# Patient Record
Sex: FEMALE | Race: WHITE | NOT HISPANIC OR LATINO | Employment: FULL TIME | URBAN - METROPOLITAN AREA
[De-identification: names, ages, dates, MRNs, and addresses within clinical notes are randomized per-mention and may not be internally consistent; named-entity substitution may affect disease eponyms.]

---

## 2018-07-12 ENCOUNTER — HOSPITAL ENCOUNTER (EMERGENCY)
Facility: HOSPITAL | Age: 33
Discharge: HOME | End: 2018-07-12
Attending: FAMILY MEDICINE | Admitting: FAMILY MEDICINE
Payer: COMMERCIAL

## 2018-07-12 VITALS
HEART RATE: 68 BPM | SYSTOLIC BLOOD PRESSURE: 118 MMHG | TEMPERATURE: 99.2 F | BODY MASS INDEX: 24.59 KG/M2 | HEIGHT: 64 IN | OXYGEN SATURATION: 95 % | RESPIRATION RATE: 16 BRPM | DIASTOLIC BLOOD PRESSURE: 82 MMHG | WEIGHT: 144 LBS

## 2018-07-12 DIAGNOSIS — H66.90 OTITIS MEDIA: ICD-10-CM

## 2018-07-12 DIAGNOSIS — T78.40XA ALLERGIC REACTION, INITIAL ENCOUNTER: Primary | ICD-10-CM

## 2018-07-12 PROCEDURE — 96372 THER/PROPH/DIAG INJ SC/IM: CPT

## 2018-07-12 PROCEDURE — 99282 EMERGENCY DEPT VISIT SF MDM: CPT

## 2018-07-12 RX ORDER — METHYLPREDNISOLONE SODIUM SUCCINATE 125 MG/2ML
125 INJECTION, POWDER, LYOPHILIZED, FOR SOLUTION INTRAMUSCULAR; INTRAVENOUS ONCE
Status: COMPLETED | OUTPATIENT
Start: 2018-07-12 | End: 2018-07-12

## 2018-07-12 RX ORDER — DIPHENHYDRAMINE HYDROCHLORIDE 50 MG/ML
50 INJECTION INTRAMUSCULAR; INTRAVENOUS ONCE
Status: COMPLETED | OUTPATIENT
Start: 2018-07-12 | End: 2018-07-12

## 2018-07-12 RX ORDER — NEOMYCIN SULFATE, POLYMYXIN B SULFATE AND HYDROCORTISONE 10; 3.5; 1 MG/ML; MG/ML; [USP'U]/ML
2 SUSPENSION/ DROPS AURICULAR (OTIC) 3 TIMES DAILY
Qty: 1.5 ML | Refills: 0 | Status: SHIPPED | OUTPATIENT
Start: 2018-07-12 | End: 2022-02-09

## 2018-07-12 RX ORDER — FAMOTIDINE 20 MG/1
20 TABLET, FILM COATED ORAL DAILY
Qty: 30 TABLET | Refills: 0 | Status: SHIPPED | OUTPATIENT
Start: 2018-07-12 | End: 2022-02-09

## 2018-07-12 RX ORDER — PREDNISONE 20 MG/1
20 TABLET ORAL DAILY
Qty: 15 TABLET | Refills: 0 | Status: SHIPPED | OUTPATIENT
Start: 2018-07-12 | End: 2018-07-17

## 2018-07-12 RX ORDER — NORGESTIMATE AND ETHINYL ESTRADIOL 7DAYSX3 28
1 KIT ORAL
COMMUNITY
Start: 2018-05-02 | End: 2022-02-09

## 2018-07-12 RX ADMIN — METHYLPREDNISOLONE SODIUM SUCCINATE 125 MG: 125 INJECTION, POWDER, FOR SOLUTION INTRAMUSCULAR; INTRAVENOUS at 14:21

## 2018-07-12 RX ADMIN — DIPHENHYDRAMINE HYDROCHLORIDE 50 MG: 50 INJECTION INTRAMUSCULAR; INTRAVENOUS at 14:21

## 2018-07-12 NOTE — ED PROVIDER NOTES
History  Chief Complaint   Patient presents with    Rash    Ear Problem       History provided by:  Patient   used: No    Rash   Location:  Shoulder/arm and torso  Torso rash location:  R breast, L breast and L chest  Quality: dryness, itchiness and redness    Quality: not blistering, not bruising, not burning, not draining, not painful, not peeling, not scaling, not swelling and not weeping    Severity:  Mild  Onset quality:  Sudden  Duration:  1 hour  Timing:  Constant  Progression:  Waxing and waning  Chronicity:  New  Context: not animal contact, not chemical exposure, not diapers, not exposure to similar rash, not food, not hot tub use, not insect bite/sting, not medications, not new detergent/soap, not nuts, not pollen, not pregnancy, not sick contacts and not sun exposure    Relieved by:  None tried  Worsened by:  Nothing  Ineffective treatments:  None tried      Prior to Admission Medications   Prescriptions Last Dose Informant Patient Reported? Taking?   norgestimate-ethinyl estradiol (TRI-SPRINTEC) 0 18/0 215/0 25 MG-35 MCG per tablet   Yes Yes   Sig: Take 1 tablet by mouth      Facility-Administered Medications: None       Past Medical History:   Diagnosis Date    Anxiety        Past Surgical History:   Procedure Laterality Date    INDUCED          History reviewed  No pertinent family history  I have reviewed and agree with the history as documented  Social History   Substance Use Topics    Smoking status: Current Every Day Smoker     Packs/day: 1 00    Smokeless tobacco: Never Used    Alcohol use No        Review of Systems   HENT: Positive for ear pain  Negative for congestion, dental problem, drooling, ear discharge, facial swelling, hearing loss, mouth sores, nosebleeds, postnasal drip, rhinorrhea, sinus pain and sinus pressure  Respiratory: Negative  Cardiovascular: Negative  Gastrointestinal: Negative  Genitourinary: Negative      Skin: Positive for rash  Allergic/Immunologic: Negative  Neurological: Negative  Psychiatric/Behavioral: Negative  Physical Exam  Physical Exam   Constitutional: She is oriented to person, place, and time  She appears well-developed and well-nourished  HENT:   Head: Normocephalic and atraumatic  Right Ear: External ear normal    Left Ear: There is tenderness  Tympanic membrane is erythematous  Nose: Nose normal    Mouth/Throat: Oropharynx is clear and moist  No oropharyngeal exudate  Eyes: Conjunctivae and EOM are normal  Pupils are equal, round, and reactive to light  Right eye exhibits no discharge  Left eye exhibits no discharge  No scleral icterus  Neck: Normal range of motion  Neck supple  Cardiovascular: Normal rate and regular rhythm  Pulmonary/Chest: Effort normal and breath sounds normal  No respiratory distress  She has no wheezes  Abdominal: Soft  Bowel sounds are normal    Lymphadenopathy:     She has no cervical adenopathy  Neurological: She is alert and oriented to person, place, and time  Skin: Skin is warm and dry  Capillary refill takes less than 2 seconds  Psychiatric: She has a normal mood and affect  Her behavior is normal    Nursing note and vitals reviewed        Vital Signs  ED Triage Vitals [07/12/18 1352]   Temperature Pulse Respirations Blood Pressure SpO2   98 8 °F (37 1 °C) 78 16 121/78 99 %      Temp Source Heart Rate Source Patient Position - Orthostatic VS BP Location FiO2 (%)   Tympanic Monitor Lying Right arm --      Pain Score       3           Vitals:    07/12/18 1352   BP: 121/78   Pulse: 78   Patient Position - Orthostatic VS: Lying       Visual Acuity      ED Medications  Medications   diphenhydrAMINE (BENADRYL) injection 50 mg (50 mg Intramuscular Given 7/12/18 1421)   methylPREDNISolone sodium succinate (Solu-MEDROL) injection 125 mg (125 mg Intramuscular Given 7/12/18 1421)       Diagnostic Studies  Results Reviewed     None                 No orders to display              Procedures  Procedures       Phone Contacts  ED Phone Contact    ED Course  ED Course as of Jul 12 1534   u Jul 12, 2018   1506 Patient states she is feeling much better  Advised to continue with the prednisone and Benadryl as needed advised return to the ED if symptoms worsen  MDM  CritCare Time    Disposition  Final diagnoses: Allergic reaction, initial encounter   Otitis media     Time reflects when diagnosis was documented in both MDM as applicable and the Disposition within this note     Time User Action Codes Description Comment    7/12/2018  2:17 PM Arpit Wetzel Add [T78 40XA] Allergic reaction, initial encounter     7/12/2018  2:18 PM Arpit Wetzel Add [H66 90] Otitis media       ED Disposition     ED Disposition Condition Comment    Discharge  [address] discharge to home/self care  Condition at discharge: Stable        Follow-up Information     Follow up With Specialties Details Why Contact Info      In 2 days If symptoms worsen pcp          Patient's Medications   Discharge Prescriptions    FAMOTIDINE (PEPCID) 20 MG TABLET    Take 1 tablet (20 mg total) by mouth daily for 5 days       Start Date: 7/12/2018 End Date: 7/17/2018       Order Dose: 20 mg       Quantity: 30 tablet    Refills: 0    NEOMYCIN-POLYMYXIN-HYDROCORTISONE (CORTISPORIN) 0 35%-10,000 UNITS/ML-1% OTIC SUSPENSION    Administer 2 drops into the left ear 3 (three) times a day for 5 days       Start Date: 7/12/2018 End Date: 7/17/2018       Order Dose: 2 drops       Quantity: 1 5 mL    Refills: 0    PREDNISONE 20 MG TABLET    Take 1 tablet (20 mg total) by mouth daily for 5 days       Start Date: 7/12/2018 End Date: 7/17/2018       Order Dose: 20 mg       Quantity: 15 tablet    Refills: 0     No discharge procedures on file      ED Provider  Electronically Signed by           Ivory Kline MD  07/12/18 451 Jakub Leonard MD  07/12/18 5004

## 2018-07-12 NOTE — ED NOTES
Patient states that she is feeling better  She is drowsy but the itching has stopped        Marcelo Boss RN  07/12/18 9078

## 2018-07-12 NOTE — DISCHARGE INSTRUCTIONS
General Allergic Reaction   WHAT YOU NEED TO KNOW:   An allergic reaction is your body's response to an allergen  Allergens include medicines, food, insect stings, animal dander, mold, latex, chemicals, and dust mites  Pollen from trees, grass, and weeds can also cause an allergic reaction  DISCHARGE INSTRUCTIONS:   Return to the emergency department if:   · You have a skin rash, hives, swelling, or itching that gets worse  · You have trouble breathing, shortness of breath, wheezing, or coughing  · Your throat tightens, or your lips or tongue swell  · You have trouble swallowing or speaking  · You have dizziness, lightheadedness, fainting, or confusion  · You have nausea, vomiting, diarrhea, or abdominal cramps  · You have chest pain or tightness  Contact your healthcare provider if:   · You have questions or concerns about your condition or care  Medicines:   · Medicines  may be given to relieve certain allergy symptoms such as itching, sneezing, and swelling  You may take them as a pill or use drops in your nose or eyes  Topical treatments may be given to put directly on your skin to help decrease itching or swelling  · Take your medicine as directed  Contact your healthcare provider if you think your medicine is not helping or if you have side effects  Tell him of her if you are allergic to any medicine  Keep a list of the medicines, vitamins, and herbs you take  Include the amounts, and when and why you take them  Bring the list or the pill bottles to follow-up visits  Carry your medicine list with you in case of an emergency  Follow up with your healthcare provider as directed:  Write down your questions so you remember to ask them during your visits  Self-care:   · Avoid the allergen  that you think may have caused your allergic reaction  · Use cold compresses  on your skin or eyes if they were affected by the allergic reaction   Cold compresses may help to soothe your skin or eyes  · Rinse your nasal passages  with a saline solution  Daily rinsing may help clear your nose of allergens  · Do not smoke  Your allergy symptoms may decrease if you are not around smoke  Nicotine and other chemicals in cigarettes and cigars can also cause lung damage  Ask your healthcare provider for information if you currently smoke and need help to quit  E-cigarettes or smokeless tobacco still contain nicotine  Talk to your healthcare provider before you use these products  © 2017 [facility] Information is for End User's use only and may not be sold, redistributed or otherwise used for commercial purposes  All illustrations and images included in CareNotes® are the copyrighted property of A D A M , Inc  or Jaspal Diana  The above information is an  only  It is not intended as medical advice for individual conditions or treatments  Talk to your doctor, nurse or pharmacist before following any medical regimen to see if it is safe and effective for you

## 2022-02-09 ENCOUNTER — OFFICE VISIT (OUTPATIENT)
Dept: FAMILY MEDICINE CLINIC | Facility: CLINIC | Age: 37
End: 2022-02-09
Payer: COMMERCIAL

## 2022-02-09 VITALS
WEIGHT: 172 LBS | DIASTOLIC BLOOD PRESSURE: 78 MMHG | OXYGEN SATURATION: 100 % | BODY MASS INDEX: 28.66 KG/M2 | TEMPERATURE: 99.3 F | HEIGHT: 65 IN | SYSTOLIC BLOOD PRESSURE: 126 MMHG | HEART RATE: 68 BPM

## 2022-02-09 DIAGNOSIS — Z23 ENCOUNTER FOR IMMUNIZATION: ICD-10-CM

## 2022-02-09 DIAGNOSIS — Z13.29 THYROID DISORDER SCREEN: ICD-10-CM

## 2022-02-09 DIAGNOSIS — Z13.220 LIPID SCREENING: ICD-10-CM

## 2022-02-09 DIAGNOSIS — F41.9 ANXIETY: ICD-10-CM

## 2022-02-09 DIAGNOSIS — Z76.89 ENCOUNTER TO ESTABLISH CARE: Primary | ICD-10-CM

## 2022-02-09 DIAGNOSIS — Z13.1 DIABETES MELLITUS SCREENING: ICD-10-CM

## 2022-02-09 DIAGNOSIS — Z11.59 NEED FOR HEPATITIS C SCREENING TEST: ICD-10-CM

## 2022-02-09 DIAGNOSIS — Z11.4 SCREENING FOR HIV (HUMAN IMMUNODEFICIENCY VIRUS): ICD-10-CM

## 2022-02-09 DIAGNOSIS — Z12.4 SCREENING FOR CERVICAL CANCER: ICD-10-CM

## 2022-02-09 DIAGNOSIS — Z23 FLU VACCINE NEED: ICD-10-CM

## 2022-02-09 DIAGNOSIS — Z71.6 ENCOUNTER FOR SMOKING CESSATION COUNSELING: ICD-10-CM

## 2022-02-09 DIAGNOSIS — F33.8 SEASONAL DEPRESSION (HCC): ICD-10-CM

## 2022-02-09 PROCEDURE — 4004F PT TOBACCO SCREEN RCVD TLK: CPT | Performed by: PHYSICIAN ASSISTANT

## 2022-02-09 PROCEDURE — 3008F BODY MASS INDEX DOCD: CPT | Performed by: PHYSICIAN ASSISTANT

## 2022-02-09 PROCEDURE — 99204 OFFICE O/P NEW MOD 45 MIN: CPT | Performed by: PHYSICIAN ASSISTANT

## 2022-02-09 PROCEDURE — 3725F SCREEN DEPRESSION PERFORMED: CPT | Performed by: PHYSICIAN ASSISTANT

## 2022-02-09 PROCEDURE — 90686 IIV4 VACC NO PRSV 0.5 ML IM: CPT

## 2022-02-09 PROCEDURE — 90471 IMMUNIZATION ADMIN: CPT

## 2022-02-09 RX ORDER — BUPROPION HYDROCHLORIDE 150 MG/1
150 TABLET ORAL EVERY MORNING
Qty: 90 TABLET | Refills: 0 | Status: SHIPPED | OUTPATIENT
Start: 2022-02-09 | End: 2022-05-03 | Stop reason: SDUPTHER

## 2022-02-09 NOTE — PROGRESS NOTES
New Patient    Molly Ontiveros 40 y o  female   Date:  2/9/2022      Assessment and Plan:    Lulu Bernardo was seen today for establish care and nicotine dependence  Diagnoses and all orders for this visit:    Encounter to establish care    Encounter for smoking cessation counseling  -     buPROPion (Wellbutrin XL) 150 mg 24 hr tablet; Take 1 tablet (150 mg total) by mouth every morning    Seasonal depression (HCC)  -     buPROPion (Wellbutrin XL) 150 mg 24 hr tablet; Take 1 tablet (150 mg total) by mouth every morning    Anxiety  -     buPROPion (Wellbutrin XL) 150 mg 24 hr tablet; Take 1 tablet (150 mg total) by mouth every morning    Need for hepatitis C screening test  -     Hepatitis C antibody; Future    Encounter for immunization  Comments:  UTD on tdap 6 years ago     Screening for HIV (human immunodeficiency virus)  -     HIV 1/2 Antigen/Antibody (4th Generation) w Reflex [facility]; Future    Screening for cervical cancer  -     Ambulatory Referral to Obstetrics / Gynecology; Future    Diabetes mellitus screening  -     Comprehensive metabolic panel; Future    Lipid screening  -     Lipid panel; Future    Thyroid disorder screen  -     TSH, 3rd generation with Free T4 reflex; Future    Flu vaccine need  -     influenza vaccine, quadrivalent, 0 5 mL, preservative-free, for adult and pediatric patients 6 mos+ (AFLURIA, FLUARIX, FLULAVAL, FLUZONE)      BMI Counseling: Body mass index is 28 62 kg/m²  The BMI is above normal  Nutrition recommendations include decreasing portion sizes and encouraging healthy choices of fruits and vegetables  Exercise recommendations include exercising 3-5 times per week  Rationale for BMI follow-up plan is due to patient being overweight or obese  She watches portion sizes and includes well balanced options    Depression Screening and Follow-up Plan: Patient's depression screening was positive with a PHQ-2 score of 3  Their PHQ-9 score was 11   Patient assessed for underlying major depression  Brief counseling provided and recommend additional follow-up/re-evaluation next office visit  HPI:  Chief Complaint   Patient presents with    hospitals Care     Many years since having a primary doctor     Nicotine Dependence     Would like to quit smoking - previous used Wellbutrin which helped      HPI   Patient is a 41 yo female who presents to [facility] care  No recent PCP cares for a few years  She has hx of seasonal, recurrent depression  No SI  She is struggling with anxiety, especially over stress of smoking, being hard on herself and the toll it is taking on her family  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, works from home, very stressful time  She had to get rid of dog  She was put on wellbutrin in the past to help her quit smoking when was previously on birth control  It helped with smoking and leveled out her mental health  She has 2 children  She would like to see OBGYN, would like to consider obtain tubal ligation  She had tdap about 6-7 years ago  She will be starting dental appts  She goes for eye exams, wears glasses  She moves 30 mins every afternoon  She follows well balanced diet and watches portions  She does use marijuana, helps to ease her anxieties and help her sleep  ROS: Review of Systems   Constitutional: Negative  Eyes: Visual disturbance: glasses  Respiratory: Negative  Cardiovascular: Negative  Gastrointestinal: Negative  Genitourinary: Negative  Neurological: Negative  Psychiatric/Behavioral: Positive for dysphoric mood and sleep disturbance  The patient is nervous/anxious          Past Medical History:   Diagnosis Date    Anxiety      Patient Active Problem List   Diagnosis    Lumbar disc herniation    Status post epidural steroid injection       Past Surgical History:   Procedure Laterality Date    INDUCED          Social History     Socioeconomic History    Marital status: Single     Spouse name: None    Number of children: None  Years of education: None    Highest education level: None   Occupational History    None   Tobacco Use    Smoking status: Current Every Day Smoker     Packs/day: 0 50    Smokeless tobacco: Never Used   Vaping Use    Vaping Use: Never used   Substance and Sexual Activity    Alcohol use: Yes     Comment: occasionally    Drug use: Yes     Types: Marijuana    Sexual activity: Yes     Partners: Female   Other Topics Concern    None   Social History Narrative    None     Social Determinants of Health     Financial Resource Strain: Not on file   Food Insecurity: Not on file   Transportation Needs: Not on file   Physical Activity: Not on file   Stress: Not on file   Social Connections: Not on file   Intimate Partner Violence: Not on file   Housing Stability: Not on file       Family History   Problem Relation Age of Onset    Alcohol abuse Mother     Diabetes Mother     Hypertension Maternal Grandmother     Cancer Paternal Grandmother     Heart disease Paternal Grandfather        No Known Allergies      Current Outpatient Medications:     buPROPion (Wellbutrin XL) 150 mg 24 hr tablet, Take 1 tablet (150 mg total) by mouth every morning, Disp: 90 tablet, Rfl: 0    PHQ-2/9 Depression Screening    Little interest or pleasure in doing things: 1 - several days  Feeling down, depressed, or hopeless: 2 - more than half the days  Trouble falling or staying asleep, or sleeping too much: 1 - several days  Feeling tired or having little energy: 2 - more than half the days  Poor appetite or overeatin - more than half the days  Feeling bad about yourself - or that you are a failure or have let yourself or your family down: 2 - more than half the days  Trouble concentrating on things, such as reading the newspaper or watching television: 1 - several days  Moving or speaking so slowly that other people could have noticed   Or the opposite - being so fidgety or restless that you have been moving around a lot more than usual: 0 - not at all  Thoughts that you would be better off dead, or of hurting yourself in some way: 0 - not at all  PHQ-2 Score: 3  PHQ-2 Interpretation: POSITIVE depression screen  PHQ-9 Score: 11   PHQ-9 Interpretation: Moderate depression        Physical Exam:  /78 (BP Location: Left arm, Patient Position: Sitting)   Pulse 68   Temp 99 3 °F (37 4 °C)   Ht 5' 5" (1 651 m)   Wt 78 kg (172 lb)   SpO2 100%   BMI 28 62 kg/m²     Physical Exam  Constitutional:       General: She is not in acute distress  Appearance: Normal appearance  She is not ill-appearing  HENT:      Head: Normocephalic and atraumatic  Right Ear: Tympanic membrane, ear canal and external ear normal       Left Ear: Tympanic membrane, ear canal and external ear normal       Nose: Nose normal       Mouth/Throat:      Mouth: Mucous membranes are moist       Pharynx: Oropharynx is clear  Eyes:      Conjunctiva/sclera: Conjunctivae normal       Pupils: Pupils are equal, round, and reactive to light  Cardiovascular:      Rate and Rhythm: Normal rate and regular rhythm  Heart sounds: No murmur heard  Pulmonary:      Effort: Pulmonary effort is normal  No respiratory distress  Breath sounds: Normal breath sounds  No wheezing  Musculoskeletal:         General: No deformity  Cervical back: Normal range of motion and neck supple  Lymphadenopathy:      Cervical: No cervical adenopathy  Skin:     General: Skin is warm and dry  Coloration: Skin is not pale  Neurological:      General: No focal deficit present  Mental Status: She is alert and oriented to person, place, and time     Psychiatric:         Mood and Affect: Mood normal          Behavior: Behavior normal          Judgment: Judgment normal

## 2022-03-03 ENCOUNTER — TELEPHONE (OUTPATIENT)
Dept: OBGYN CLINIC | Facility: MEDICAL CENTER | Age: 37
End: 2022-03-03

## 2022-03-09 ENCOUNTER — TELEMEDICINE (OUTPATIENT)
Dept: FAMILY MEDICINE CLINIC | Facility: CLINIC | Age: 37
End: 2022-03-09
Payer: COMMERCIAL

## 2022-03-09 DIAGNOSIS — Z71.6 ENCOUNTER FOR SMOKING CESSATION COUNSELING: Primary | ICD-10-CM
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
## 2022-03-09 DIAGNOSIS — F33.8 SEASONAL DEPRESSION (HCC): ICD-10-CM

## 2022-03-09 DIAGNOSIS — F41.9 ANXIETY: ICD-10-CM

## 2022-03-09 PROCEDURE — 99213 OFFICE O/P EST LOW 20 MIN: CPT | Performed by: PHYSICIAN ASSISTANT

## 2022-03-09 PROCEDURE — 4004F PT TOBACCO SCREEN RCVD TLK: CPT | Performed by: PHYSICIAN ASSISTANT

## 2022-03-09 NOTE — PROGRESS NOTES
Virtual Regular Visit    Verification of patient location:    Patient is located in the following state in which I hold an active license [place]      Assessment/Plan:    Problem List Items Addressed This Visit     None      Visit Diagnoses     Encounter for smoking cessation counseling    -  Primary    was able to wean now not buying any more; will continue wellbutrin to help reduce cravings; keep up the great work    Seasonal depression ([facility] [address] )        mood stable and improved with wellbutrin, no changes    Anxiety        stable and improved with wellbutrin, no changes     Overuse injury        carpal tunnel vs tendinitis; neutral brace, ice 20 mins, prn NSAID, can continue thc lotion prn                Reason for visit is   Chief Complaint   Patient presents with    Virtual Regular Visit        Encounter provider Travis Jacobs PA-C    Provider located at [address] [facility],   [address] [facility] Jey Brown      Recent Visits  No visits were found meeting these conditions  Showing recent visits within past 7 days and meeting all other requirements  Today's Visits  Date Type Provider Dept   03/09/22 Telemedicine Travis Jacobs PA-C [facility] [address] [facility] today's visits and meeting all other requirements  Future Appointments  No visits were found meeting these conditions  Showing future appointments within next 150 days and meeting all other requirements       The patient was identified by name and date of birth  Danielle Murphy was informed that this is a telemedicine visit and that the visit is being conducted through [address] Now and patient was informed that this is a secure, HIPAA-compliant platform  She agrees to proceed     My office door was closed  No one else was in the room  She acknowledged consent and understanding of privacy and security of the video platform   The patient has agreed to participate and understands they can discontinue the visit at any time  Patient is aware this is a billable service  Subjective  Donya Saucedo is a 40 y o  female who presents for 1 month follow up  HPI   At last visit, wellbutrin was started for smoking cessation and anxiety/depression  She has been able to wean down to 1-2 cig per day  She is hoping to not buy any more packs and appreciates that the urge to smoke is much improved, just needs to break the habit  She has noticed a marked improvement in overall mood and 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  She does not feel need to increase dose at this time  She is having pain and numbness in her R hand  Repetitively using hand - typing and mousing all day  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  She was able to use thc lotion with some relief  Past Medical History:   Diagnosis Date    Anxiety        Past Surgical History:   Procedure Laterality Date    INDUCED          Current Outpatient Medications   Medication Sig Dispense Refill    buPROPion (Wellbutrin XL) 150 mg 24 hr tablet Take 1 tablet (150 mg total) by mouth every morning 90 tablet 0     No current facility-administered medications for this visit  No Known Allergies    Review of Systems   Constitutional: Negative  Musculoskeletal: Positive for arthralgias (hand) and myalgias (oversue clicking/typing/using mouse)  Psychiatric/Behavioral: Dysphoric mood: improved  Nervous/anxious: improved, better able to cope with stress of work         Video Exam    There were no vitals filed for this visit  Physical Exam  Constitutional:       General: She is not in acute distress  Appearance: Normal appearance  Pulmonary:      Effort: No respiratory distress  Musculoskeletal:      Comments: ROM intact of R wrist   Neurological:      General: No focal deficit present  Mental Status: She is alert and oriented to person, place, and time     Psychiatric:         Mood and Affect: Mood normal          Behavior: Behavior normal          Thought Content: Thought content normal          Judgment: Judgment normal           I spent 10 minutes directly with the patient during this visit    [address] verbally agrees to participate in [employer]  Pt is aware that [employer] could be limited without vital signs or the ability to perform a full hands-on physical Garcia Screws understands she or the provider may request at any time to terminate the video visit and request the patient to seek care or treatment in person

## 2022-03-28 NOTE — PROGRESS NOTES
[facility] [place], [place]    ASSESSMENT/PLAN: Danielle Murphy is a 40 y o  No obstetric history on file  who presents for annual gynecologic exam     Encounter for routine gynecologic examination  - Routine well woman exam completed today  - Cervical Cancer Screening: Current ASCCP Guidelines reviewed  Last Pap: Not on file 2018  Next Pap Due: today  - HPV Vaccination status: Not immunized  - STI screening offered including HIV testing: not indicated based on hx or requested at time of visit  - Contraceptive counseling discussed  Current contraception: desires permanent sterilization, but at this time would like to go back on OCP  States that she only smokes 1 cig a day and had end date for 20 days from now  -- RTO 3 months OCP check  - RTO 1 yr for annual  Will refer to physician when she is ready to have tubal       Additional problems addressed during this visit:  1  Routine gynecological examination  -     Liquid-based pap, screening    2  Screening for malignant neoplasm of cervix  -     Liquid-based pap, screening    3  Screening for cervical cancer  -     Ambulatory Referral to Obstetrics / Gynecology    4  BCP (birth control pills) initiation  -     Norethin-Eth Estrad-Fe Biphas (Lo Loestrin Fe) 1 MG-10 MCG / 10 MCG TABS; Take 1 tablet by mouth daily    Risk/benefits, side effects and administration reviewed  Questions asked and answered  Verbalizes understanding    - reviewed smoking and risks  States that she is down to 1 per day and quitting in 20 days  - will have 3 month follow-up and if still smoking will need to consider progestin only         CC:  Annual Gynecologic Examination    HPI: Danielle Murphy is a 40 y o  No obstetric history on file  who presents for annual gynecologic examination  Amy Means presents for gyn exam today  3/8/22 LMP  Menses is regular, flow is 3-5 days or moderate flow, every 25-30 days   Using condoms as birth control method  Happy with method  Last pap smear 2018, no history of abnormal pap smear  6-8 hrs sleep per day  2-3 servings of calcium rich food per day  Usually walks and hikes regularly  2-3 servings of caffeine per day  Ocassionally perform SBE monthly  At this time would like an oral contraceptive- we discussed fact that she is a smoker  States that she is on Wellbutrin and is down to 1 cig per day and plans to stop in 20 days  Concerns : States that she would like to have a tubal ligation and states that she never wants to get pregnant again and would like to start a birth control pill until she can have tubal  Does not want long term hormonal treatment and does not want an IUD  -  - Today we had a detailed discussion regarding alternatives to permanent contraception, including LARC methods such as intrauterine device and subdermal contraceptive implant  We also discussed the option of partner vasectomy as an option that has lower morbidity   The patient would like to proceed with laparoscopic salpingectomy  - We discussed that tubal sterilization is considered a permanent procedure  Future pregnancy would only be possible through in vitro fertilization   We discussed that most women are satisfied with their decision to undergo permanent sterilization, but some women experience regret ranging widely from 2 to 26 percent  The following portions of the patient's history were reviewed and updated as appropriate: allergies, current medications, past family history, past medical history, obstetric history, gynecologic history, past social history, past surgical history and problem list     Review of Systems   Constitutional: Negative for activity change, appetite change, fatigue and fever  Respiratory: Negative for cough and shortness of breath  Cardiovascular: Negative for chest pain, palpitations and leg swelling  Gastrointestinal: Negative for constipation and diarrhea     Genitourinary: Negative for difficulty urinating, frequency, menstrual problem, pelvic pain, vaginal bleeding, vaginal discharge and vaginal pain  Neurological: Negative for light-headedness and headaches  Psychiatric/Behavioral: The patient is not nervous/anxious  Objective:  /68 (BP Location: Right arm, Patient Position: Sitting, Cuff Size: Standard)   Ht 5' 5" (1 651 m)   Wt 76 8 kg (169 lb 6 4 oz)   LMP 03/08/2022 (Approximate)   BMI 28 19 kg/m²    Physical Exam  Vitals reviewed  Constitutional:       Appearance: Normal appearance  HENT:      Head: Normocephalic  Neck:      Thyroid: No thyroid mass or thyroid tenderness  Cardiovascular:      Rate and Rhythm: Normal rate and regular rhythm  Heart sounds: Normal heart sounds  Pulmonary:      Effort: Pulmonary effort is normal       Breath sounds: Normal breath sounds  Chest:   Breasts:      Right: No mass, nipple discharge, skin change, tenderness or axillary adenopathy  Left: No mass, nipple discharge, skin change, tenderness or axillary adenopathy  Abdominal:      General: There is no distension  Palpations: There is no mass  Tenderness: There is no abdominal tenderness  There is no guarding  Genitourinary:     General: Normal vulva  Exam position: Lithotomy position  Labia:         Right: No tenderness or lesion  Left: No tenderness or lesion  Vagina: No vaginal discharge, tenderness, bleeding or lesions  Cervix: No discharge, lesion, erythema or cervical bleeding  Uterus: Normal  Not enlarged and not tender  Adnexa:         Right: No mass, tenderness or fullness  Left: No mass, tenderness or fullness  Comments: Clitoral piercing- neg redness or irritation  Musculoskeletal:      Cervical back: Normal range of motion  Lymphadenopathy:      Upper Body:      Right upper body: No axillary adenopathy  Left upper body: No axillary adenopathy     Skin:     General: Skin is warm and dry  Neurological:      Mental Status: She is alert     Psychiatric:         Mood and Affect: Mood normal          Behavior: Behavior normal          Judgment: Judgment normal

## 2022-03-29 NOTE — PATIENT INSTRUCTIONS
Breast Self Exam for Women   AMBULATORY CARE:   A breast self-exam (BSE)  is a way to check your breasts for lumps and other changes  Regular BSEs can help you know how your breasts normally look and feel  Most breast lumps or changes are not cancer, but you should always have them checked by a healthcare provider  Why you should do a BSE:  Breast cancer is the most common type of cancer in women  Even if you have mammograms, you may still want to do a BSE regularly  If you know how your breasts normally feel and look, it may help you know when to contact your healthcare provider  Mammograms can miss some cancers  You may find a lump during a BSE that did not show up on a mammogram   When you should do a BSE:  If you have periods, you may want to do your BSE 1 week after your period ends  This is the time when your breasts may be the least swollen, lumpy, or tender  You can do regular BSEs even if you are breastfeeding or have breast implants  Call your doctor if:   · You find any lumps or changes in your breasts  · You have breast pain or fluid coming from your nipples  · You have questions or concerns about your condition or care  How to do a BSE:       · Look at your breasts in a mirror  Look at the size and shape of each breast and nipple  Check for swelling, lumps, dimpling, scaly skin, or other skin changes  Look for nipple changes, such as a nipple that is painful or beginning to pull inward  Gently squeeze both nipples and check to see if fluid (that is not breast milk) comes out of them  If you find any of these or other breast changes, contact your healthcare provider  Check your breasts while you sit or  the following 3 positions:    ? Hang your arms down at your sides  ? Raise your hands and join them behind your head  ? Put firm pressure with your hands on your hips  Bend slightly forward while you look at your breasts in the mirror  · Lie down and feel your breasts    When you lie down, your breast tissue spreads out evenly over your chest  This makes it easier for you to feel for lumps and anything that may not be normal for your breasts  Do a BSE on one breast at a time  ? Place a small pillow or towel under your left shoulder  Put your left arm behind your head  ? Use the 3 middle fingers of your right hand  Use your fingertip pads, on the top of your fingers  Your fingertip pad is the most sensitive part of your finger  ? Use small circles to feel your breast tissue  Use your fingertip pads to make dime-sized, overlapping circles on your breast and armpits  Use light, medium, and firm pressure  First, press lightly  Second, press with medium pressure to feel a little deeper into the breast  Last, use firm pressure to feel deep within your breast     ? Examine your entire breast area  Examine the breast area from above the breast to below the breast where you feel only ribs  Make small circles with your fingertips, starting in the middle of your armpit  Make circles going up and down the breast area  Continue toward your breast and all the way across it  Examine the area from your armpit all the way over to the middle of your chest (breastbone)  Stop at the middle of your chest     ? Move the pillow or towel to your right shoulder, and put your right arm behind your head  Use the 3 fingertip pads of your left hand, and repeat the above steps to do a BSE on your right breast     What else you can do to check for breast problems or cancer:  Talk to your healthcare provider about mammograms  A mammogram is an x-ray of your breasts to screen for breast cancer or other problems  Your provider can tell you the benefits and risks of mammograms  The first mammogram is usually at age 39 or 48  Your provider may recommend you start at 36 or younger if your risk for breast cancer is high  Mammograms usually continue every 1 to 2 years until age 76         Follow up with your doctor as directed:  Write down your questions so you remember to ask them during your visits  © Copyright [employer] 2022 Information is for End User's use only and may not be sold, redistributed or otherwise used for commercial purposes  All illustrations and images included in CareNotes® are the copyrighted property of A D A M , Inc  or Tess Carlos  The above information is an  only  It is not intended as medical advice for individual conditions or treatments  Talk to your doctor, nurse or pharmacist before following any medical regimen to see if it is safe and effective for you

## 2022-03-30 ENCOUNTER — OFFICE VISIT (OUTPATIENT)
Dept: OBGYN CLINIC | Facility: CLINIC | Age: 37
End: 2022-03-30
Payer: COMMERCIAL

## 2022-03-30 VITALS
DIASTOLIC BLOOD PRESSURE: 68 MMHG | SYSTOLIC BLOOD PRESSURE: 114 MMHG | BODY MASS INDEX: 28.22 KG/M2 | HEIGHT: 65 IN | WEIGHT: 169.4 LBS

## 2022-03-30 DIAGNOSIS — Z30.011 BCP (BIRTH CONTROL PILLS) INITIATION: ICD-10-CM

## 2022-03-30 DIAGNOSIS — Z12.4 SCREENING FOR MALIGNANT NEOPLASM OF CERVIX: ICD-10-CM

## 2022-03-30 DIAGNOSIS — Z01.419 ROUTINE GYNECOLOGICAL EXAMINATION: Primary | ICD-10-CM

## 2022-03-30 DIAGNOSIS — Z12.4 SCREENING FOR CERVICAL CANCER: ICD-10-CM

## 2022-03-30 PROCEDURE — G0476 HPV COMBO ASSAY CA SCREEN: HCPCS | Performed by: ADVANCED PRACTICE MIDWIFE

## 2022-03-30 PROCEDURE — S0610 ANNUAL GYNECOLOGICAL EXAMINA: HCPCS | Performed by: ADVANCED PRACTICE MIDWIFE

## 2022-03-30 PROCEDURE — G0145 SCR C/V CYTO,THINLAYER,RESCR: HCPCS | Performed by: ADVANCED PRACTICE MIDWIFE

## 2022-03-30 PROCEDURE — 3008F BODY MASS INDEX DOCD: CPT | Performed by: PHYSICIAN ASSISTANT

## 2022-03-30 RX ORDER — NORETHINDRONE ACETATE AND ETHINYL ESTRADIOL, ETHINYL ESTRADIOL AND FERROUS FUMARATE 1MG-10(24)
1 KIT ORAL DAILY
Qty: 84 TABLET | Refills: 0 | Status: SHIPPED | OUTPATIENT
Start: 2022-03-30 | End: 2022-06-28 | Stop reason: SDUPTHER

## 2022-03-31 LAB
HPV HR 12 DNA CVX QL NAA+PROBE: NEGATIVE
HPV16 DNA CVX QL NAA+PROBE: NEGATIVE
HPV18 DNA CVX QL NAA+PROBE: NEGATIVE

## 2022-04-08 LAB
LAB AP GYN PRIMARY INTERPRETATION: NORMAL
Lab: NORMAL

## 2022-05-01 ENCOUNTER — HOSPITAL ENCOUNTER (EMERGENCY)
Facility: HOSPITAL | Age: 37
Discharge: HOME | End: 2022-05-01
Attending: EMERGENCY MEDICINE
Payer: COMMERCIAL

## 2022-05-01 ENCOUNTER — APPOINTMENT (EMERGENCY)
Dept: CT IMAGING | Facility: HOSPITAL | Age: 37
End: 2022-05-01
Payer: COMMERCIAL

## 2022-05-01 VITALS
BODY MASS INDEX: 29.88 KG/M2 | TEMPERATURE: 97.8 F | HEART RATE: 76 BPM | OXYGEN SATURATION: 100 % | RESPIRATION RATE: 18 BRPM | SYSTOLIC BLOOD PRESSURE: 134 MMHG | DIASTOLIC BLOOD PRESSURE: 70 MMHG | WEIGHT: 175 LBS | HEIGHT: 64 IN

## 2022-05-01 DIAGNOSIS — R10.9 LEFT FLANK PAIN: ICD-10-CM

## 2022-05-01 DIAGNOSIS — N12 PYELONEPHRITIS: Primary | ICD-10-CM

## 2022-05-01 LAB
ALBUMIN SERPL BCP-MCNC: 4.3 G/DL (ref 3.5–5)
ALP SERPL-CCNC: 39 U/L (ref 34–104)
ALT SERPL W P-5'-P-CCNC: 9 U/L (ref 7–52)
ANION GAP SERPL CALCULATED.3IONS-SCNC: 8 MMOL/L (ref 4–13)
AST SERPL W P-5'-P-CCNC: 8 U/L (ref 13–39)
BACTERIA UR QL AUTO: ABNORMAL /HPF
BASOPHILS # BLD AUTO: 0.03 THOUSANDS/ΜL (ref 0–0.1)
BASOPHILS NFR BLD AUTO: 0 % (ref 0–1)
BILIRUB SERPL-MCNC: 0.36 MG/DL (ref 0.2–1)
BILIRUB UR QL STRIP: NEGATIVE
BUN SERPL-MCNC: 12 MG/DL (ref 5–25)
CALCIUM SERPL-MCNC: 9.1 MG/DL (ref 8.4–10.2)
CHLORIDE SERPL-SCNC: 103 MMOL/L (ref 96–108)
CLARITY UR: ABNORMAL
CO2 SERPL-SCNC: 25 MMOL/L (ref 21–32)
COLOR UR: YELLOW
CREAT SERPL-MCNC: 0.71 MG/DL (ref 0.6–1.3)
EOSINOPHIL # BLD AUTO: 0.08 THOUSAND/ΜL (ref 0–0.61)
EOSINOPHIL NFR BLD AUTO: 1 % (ref 0–6)
ERYTHROCYTE [DISTWIDTH] IN BLOOD BY AUTOMATED COUNT: 13.5 % (ref 11.6–15.1)
EXT PREG TEST URINE: NEGATIVE
EXT. CONTROL ED NAV: NORMAL
GFR SERPL CREATININE-BSD FRML MDRD: 109 ML/MIN/1.73SQ M
GLUCOSE SERPL-MCNC: 86 MG/DL (ref 65–140)
GLUCOSE UR STRIP-MCNC: NEGATIVE MG/DL
HCT VFR BLD AUTO: 41.9 % (ref 34.8–46.1)
HGB BLD-MCNC: 14 G/DL (ref 11.5–15.4)
HGB UR QL STRIP.AUTO: ABNORMAL
IMM GRANULOCYTES # BLD AUTO: 0.02 THOUSAND/UL (ref 0–0.2)
IMM GRANULOCYTES NFR BLD AUTO: 0 % (ref 0–2)
KETONES UR STRIP-MCNC: NEGATIVE MG/DL
LEUKOCYTE ESTERASE UR QL STRIP: ABNORMAL
LIPASE SERPL-CCNC: 21 U/L (ref 11–82)
LYMPHOCYTES # BLD AUTO: 1.78 THOUSANDS/ΜL (ref 0.6–4.47)
LYMPHOCYTES NFR BLD AUTO: 24 % (ref 14–44)
MCH RBC QN AUTO: 30.4 PG (ref 26.8–34.3)
MCHC RBC AUTO-ENTMCNC: 33.4 G/DL (ref 31.4–37.4)
MCV RBC AUTO: 91 FL (ref 82–98)
MONOCYTES # BLD AUTO: 0.48 THOUSAND/ΜL (ref 0.17–1.22)
MONOCYTES NFR BLD AUTO: 7 % (ref 4–12)
NEUTROPHILS # BLD AUTO: 4.98 THOUSANDS/ΜL (ref 1.85–7.62)
NEUTS SEG NFR BLD AUTO: 68 % (ref 43–75)
NITRITE UR QL STRIP: NEGATIVE
NON-SQ EPI CELLS URNS QL MICRO: ABNORMAL /HPF
NRBC BLD AUTO-RTO: 0 /100 WBCS
PH UR STRIP.AUTO: 6.5 [PH]
PLATELET # BLD AUTO: 255 THOUSANDS/UL (ref 149–390)
PMV BLD AUTO: 9.4 FL (ref 8.9–12.7)
POTASSIUM SERPL-SCNC: 4.4 MMOL/L (ref 3.5–5.3)
PROT SERPL-MCNC: 7.6 G/DL (ref 6.4–8.4)
PROT UR STRIP-MCNC: ABNORMAL MG/DL
RBC # BLD AUTO: 4.6 MILLION/UL (ref 3.81–5.12)
RBC #/AREA URNS AUTO: ABNORMAL /HPF
SODIUM SERPL-SCNC: 136 MMOL/L (ref 135–147)
SP GR UR STRIP.AUTO: 1.01 (ref 1–1.03)
UROBILINOGEN UR QL STRIP.AUTO: 0.2 E.U./DL
WBC # BLD AUTO: 7.37 THOUSAND/UL (ref 4.31–10.16)
WBC #/AREA URNS AUTO: ABNORMAL /HPF

## 2022-05-01 PROCEDURE — 96374 THER/PROPH/DIAG INJ IV PUSH: CPT

## 2022-05-01 PROCEDURE — 87186 SC STD MICRODIL/AGAR DIL: CPT

## 2022-05-01 PROCEDURE — 87077 CULTURE AEROBIC IDENTIFY: CPT

## 2022-05-01 PROCEDURE — 80053 COMPREHEN METABOLIC PANEL: CPT

## 2022-05-01 PROCEDURE — 81001 URINALYSIS AUTO W/SCOPE: CPT

## 2022-05-01 PROCEDURE — 96375 TX/PRO/DX INJ NEW DRUG ADDON: CPT

## 2022-05-01 PROCEDURE — 99285 EMERGENCY DEPT VISIT HI MDM: CPT | Performed by: PHYSICIAN ASSISTANT

## 2022-05-01 PROCEDURE — 81003 URINALYSIS AUTO W/O SCOPE: CPT

## 2022-05-01 PROCEDURE — 96361 HYDRATE IV INFUSION ADD-ON: CPT

## 2022-05-01 PROCEDURE — 36415 COLL VENOUS BLD VENIPUNCTURE: CPT

## 2022-05-01 PROCEDURE — 83690 ASSAY OF LIPASE: CPT

## 2022-05-01 PROCEDURE — 81025 URINE PREGNANCY TEST: CPT

## 2022-05-01 PROCEDURE — 74176 CT ABD & PELVIS W/O CONTRAST: CPT

## 2022-05-01 PROCEDURE — 85025 COMPLETE CBC W/AUTO DIFF WBC: CPT

## 2022-05-01 PROCEDURE — 99284 EMERGENCY DEPT VISIT MOD MDM: CPT

## 2022-05-01 PROCEDURE — 87086 URINE CULTURE/COLONY COUNT: CPT

## 2022-05-01 RX ORDER — FENTANYL CITRATE 50 UG/ML
50 INJECTION, SOLUTION INTRAMUSCULAR; INTRAVENOUS ONCE
Status: COMPLETED | OUTPATIENT
Start: 2022-05-01 | End: 2022-05-01

## 2022-05-01 RX ORDER — IBUPROFEN 600 MG/1
600 TABLET ORAL EVERY 6 HOURS PRN
Qty: 30 TABLET | Refills: 0 | Status: SHIPPED | OUTPATIENT
Start: 2022-05-01

## 2022-05-01 RX ORDER — ONDANSETRON 2 MG/ML
4 INJECTION INTRAMUSCULAR; INTRAVENOUS ONCE
Status: COMPLETED | OUTPATIENT
Start: 2022-05-01 | End: 2022-05-01

## 2022-05-01 RX ORDER — KETOROLAC TROMETHAMINE 30 MG/ML
15 INJECTION, SOLUTION INTRAMUSCULAR; INTRAVENOUS ONCE
Status: COMPLETED | OUTPATIENT
Start: 2022-05-01 | End: 2022-05-01

## 2022-05-01 RX ORDER — CEFDINIR 300 MG/1
300 CAPSULE ORAL EVERY 12 HOURS SCHEDULED
Status: DISCONTINUED | OUTPATIENT
Start: 2022-05-01 | End: 2022-05-01 | Stop reason: HOSPADM

## 2022-05-01 RX ORDER — CEFDINIR 300 MG/1
300 CAPSULE ORAL EVERY 12 HOURS SCHEDULED
Qty: 20 CAPSULE | Refills: 0 | Status: SHIPPED | OUTPATIENT
Start: 2022-05-01 | End: 2022-05-03

## 2022-05-01 RX ORDER — ONDANSETRON 4 MG/1
4 TABLET, ORALLY DISINTEGRATING ORAL EVERY 6 HOURS PRN
Qty: 12 TABLET | Refills: 0 | Status: SHIPPED | OUTPATIENT
Start: 2022-05-01

## 2022-05-01 RX ADMIN — CEFDINIR 300 MG: 300 CAPSULE ORAL at 11:33

## 2022-05-01 RX ADMIN — KETOROLAC TROMETHAMINE 15 MG: 30 INJECTION, SOLUTION INTRAMUSCULAR at 10:19

## 2022-05-01 RX ADMIN — ONDANSETRON 4 MG: 2 INJECTION INTRAMUSCULAR; INTRAVENOUS at 10:19

## 2022-05-01 RX ADMIN — FENTANYL CITRATE 50 MCG: 50 INJECTION, SOLUTION INTRAMUSCULAR; INTRAVENOUS at 10:19

## 2022-05-01 RX ADMIN — SODIUM CHLORIDE 1000 ML: 0.9 INJECTION, SOLUTION INTRAVENOUS at 10:25

## 2022-05-01 NOTE — DISCHARGE INSTRUCTIONS
Take 600mg of Ibuprofen every 6-8 hours with food as needed for pain  You may also take 1000mg of Tylenol every 6-8 hours as needed for pain with a maximum dose of 3000mg of tylenol per day  They are safe to take together or you may alternate them if you prefer  Take Zofran as needed for nausea  Take antibiotic as directed  Follow-up with family doctor for a repeat evaluation within the next week  Return to the ER with any significant change or worsening of your symptoms

## 2022-05-01 NOTE — ED PROVIDER NOTES
History  Chief Complaint   Patient presents with    Flank Pain     Patient reports severe radiating left lower back pain radiating into the left flank and down into the abdomen  Mild pain starrting last night, inreased and sharp stabbing pain today  27-year-old female history of anxiety presents accompanied by her partner and daughter complaining of left flank pain  Patient reports feeling as if she may be developing a UTI symptoms over the past few days  She states that she had a relatively abrupt onset of severe left flank pain that started this morning radiating to her groin  She denies any fevers, chills, chest pain, shortness of breath, vomiting, pelvic pain or vaginal discharge  Patient denies any dysuria or frequency but does report some pressure around her bladder  Prior to Admission Medications   Prescriptions Last Dose Informant Patient Reported? Taking? Norethin-Eth Estrad-Fe Biphas (Lo Loestrin Fe) 1 MG-10 MCG / 10 MCG TABS 2022 at Unknown time  No Yes   Sig: Take 1 tablet by mouth daily   buPROPion (Wellbutrin XL) 150 mg 24 hr tablet 2022 at Unknown time  No Yes   Sig: Take 1 tablet (150 mg total) by mouth every morning      Facility-Administered Medications: None       Past Medical History:   Diagnosis Date    Anxiety        Past Surgical History:   Procedure Laterality Date    INDUCED          Family History   Problem Relation Age of Onset    Alcohol abuse Mother     Diabetes Mother     Hypertension Maternal Grandmother     Cancer Paternal Grandmother     Heart disease Paternal Grandfather      I have reviewed and agree with the history as documented      E-Cigarette/Vaping    E-Cigarette Use Never User      E-Cigarette/Vaping Substances    Nicotine No     THC No     CBD No     Flavoring No     Other No     Unknown No      Social History     Tobacco Use    Smoking status: Former Smoker     Packs/day: 0 10     Types: Cigarettes    Smokeless tobacco: Never Used    Tobacco comment: smokes 1 cig per day plans to stop by summer   Vaping Use    Vaping Use: Never used   Substance Use Topics    Alcohol use: Yes     Alcohol/week: 2 0 standard drinks     Types: 2 Cans of beer per week     Comment: occasionally    Drug use: Yes     Types: Marijuana       Review of Systems   Constitutional: Negative for chills, fatigue and fever  HENT: Negative for ear pain and sore throat  Eyes: Negative for pain  Respiratory: Negative for cough, shortness of breath and wheezing  Cardiovascular: Negative for chest pain, palpitations and leg swelling  Gastrointestinal: Negative for abdominal pain, constipation, diarrhea, nausea and vomiting  Endocrine: Negative for polyuria  Genitourinary: Positive for flank pain  Negative for dysuria and pelvic pain  Musculoskeletal: Negative for arthralgias, myalgias, neck pain and neck stiffness  Skin: Negative for rash  Neurological: Negative for dizziness, syncope, light-headedness and headaches  All other systems reviewed and are negative  Physical Exam  Physical Exam  Constitutional:       General: She is not in acute distress  Appearance: Normal appearance  She is well-developed  HENT:      Head: Normocephalic and atraumatic  Right Ear: External ear normal       Left Ear: External ear normal       Mouth/Throat:      Pharynx: No oropharyngeal exudate  Eyes:      Extraocular Movements: Extraocular movements intact  Cardiovascular:      Rate and Rhythm: Normal rate and regular rhythm  Heart sounds: Normal heart sounds  Pulmonary:      Effort: Pulmonary effort is normal       Breath sounds: Normal breath sounds  Abdominal:      General: Bowel sounds are normal       Palpations: Abdomen is soft  Tenderness: There is no abdominal tenderness  There is left CVA tenderness  Musculoskeletal:         General: Normal range of motion  Cervical back: Normal range of motion     Skin: General: Skin is warm  Capillary Refill: Capillary refill takes less than 2 seconds  Neurological:      General: No focal deficit present  Mental Status: She is alert and oriented to person, place, and time     Psychiatric:         Mood and Affect: Mood normal          Vital Signs  ED Triage Vitals   Temperature Pulse Respirations Blood Pressure SpO2   05/01/22 0947 05/01/22 0947 05/01/22 0947 05/01/22 0947 05/01/22 0947   97 8 °F (36 6 °C) 76 18 134/70 100 %      Temp Source Heart Rate Source Patient Position - Orthostatic VS BP Location FiO2 (%)   05/01/22 0947 05/01/22 0947 -- 05/01/22 0947 --   Tympanic Monitor  Left arm       Pain Score       05/01/22 1019       8           Vitals:    05/01/22 0947   BP: 134/70   Pulse: 76         Visual Acuity      ED Medications  Medications   sodium chloride 0 9 % bolus 1,000 mL (1,000 mL Intravenous New Bag 5/1/22 1025)   cefdinir (OMNICEF) capsule 300 mg (has no administration in time range)   fentanyl citrate (PF) 100 MCG/2ML 50 mcg (50 mcg Intravenous Given 5/1/22 1019)   ketorolac (TORADOL) injection 15 mg (15 mg Intravenous Given 5/1/22 1019)   ondansetron (ZOFRAN) injection 4 mg (4 mg Intravenous Given 5/1/22 1019)       Diagnostic Studies  Results Reviewed     Procedure Component Value Units Date/Time    Lipase [[id number]]  (Normal) Collected: 05/01/22 0958    Lab Status: Final result Specimen: Blood from Arm, Left Updated: 05/01/22 1029     Lipase 21 u/L     Comprehensive metabolic panel [[id number]]  (Abnormal) Collected: 05/01/22 0958    Lab Status: Final result Specimen: Blood from Arm, Left Updated: 05/01/22 1029     Sodium 136 mmol/L      Potassium 4 4 mmol/L      Chloride 103 mmol/L      CO2 25 mmol/L      ANION GAP 8 mmol/L      BUN 12 mg/dL      Creatinine 0 71 mg/dL      Glucose 86 mg/dL      Calcium 9 1 mg/dL      AST 8 U/L      ALT 9 U/L      Alkaline Phosphatase 39 U/L      Total Protein 7 6 g/dL      Albumin 4 3 g/dL      Total Bilirubin 0 36 mg/dL      eGFR 109 ml/min/1 73sq m     Narrative:      [place] guidelines for Chronic Kidney Disease (CKD):     Stage 1 with normal or high GFR (GFR > 90 mL/min/1 73 square meters)    Stage 2 Mild CKD (GFR = 60-89 mL/min/1 73 square meters)    Stage 3A Moderate CKD (GFR = 45-59 mL/min/1 73 square meters)    Stage 3B Moderate CKD (GFR = 30-44 mL/min/1 73 square meters)    Stage 4 Severe CKD (GFR = 15-29 mL/min/1 73 square meters)    Stage 5 End Stage CKD (GFR <15 mL/min/1 73 square meters)  Note: GFR calculation is accurate only with a steady state creatinine    CBC and differential [[id number]] Collected: 05/01/22 0958    Lab Status: Final result Specimen: Blood from Arm, Right Updated: 05/01/22 1022     WBC 7 37 Thousand/uL      RBC 4 60 Million/uL      Hemoglobin 14 0 g/dL      Hematocrit 41 9 %      MCV 91 fL      MCH 30 4 pg      MCHC 33 4 g/dL      RDW 13 5 %      MPV 9 4 fL      Platelets 366 Thousands/uL      nRBC 0 /100 WBCs      Neutrophils Relative 68 %      Immat GRANS % 0 %      Lymphocytes Relative 24 %      Monocytes Relative 7 %      Eosinophils Relative 1 %      Basophils Relative 0 %      Neutrophils Absolute 4 98 Thousands/µL      Immature Grans Absolute 0 02 Thousand/uL      Lymphocytes Absolute 1 78 Thousands/µL      Monocytes Absolute 0 48 Thousand/µL      Eosinophils Absolute 0 08 Thousand/µL      Basophils Absolute 0 03 Thousands/µL     Urine Microscopic [[id number]]  (Abnormal) Collected: 05/01/22 1000    Lab Status: Final result Specimen: Urine, Clean Catch Updated: 05/01/22 1019     RBC, UA 2-4 /hpf      WBC, UA 10-20 /hpf      Epithelial Cells Occasional /hpf      Bacteria, UA Moderate /hpf     Urine culture [[id number]] Collected: 05/01/22 1000    Lab Status:  In process Specimen: Urine, Clean Catch Updated: 05/01/22 1019    UA w Reflex to Microscopic w Reflex to Culture [[id number]]  (Abnormal) Collected: 05/01/22 1000    Lab Status: Final result Specimen: Urine, Clean Catch Updated: 05/01/22 1010     Color, UA Yellow     Clarity, UA Slightly Cloudy     Specific [place], UA 1 010     pH, UA 6 5     Leukocytes, UA 1+     Nitrite, UA Negative     Protein, UA 1+ mg/dl      Glucose, UA Negative mg/dl      Ketones, UA Negative mg/dl      Urobilinogen, UA 0 2 E U /dl      Bilirubin, UA Negative     Blood, UA 2+    POCT pregnancy, urine [[id number]]  (Normal) Resulted: 05/01/22 1001    Lab Status: Final result Specimen: Urine Updated: 05/01/22 1001     EXT PREG TEST UR (Ref: Negative) negative     Control valid                 CT renal stone study abdomen pelvis wo contrast   Final Result by Lisa Martin MD (05/01 1022)      No nephrolithiasis or hydroureteronephrosis  Workstation performed: [id number]                    Procedures  Procedures         ED Course                               SBIRT 22yo+      Most Recent Value   SBIRT (22 yo +)    In order to provide better care to our patients, we are screening all of our patients for alcohol and drug use  Would it be okay to ask you these screening questions? Yes Filed at: 05/01/2022 4237   Initial Alcohol Screen: US AUDIT-C     1  How often do you have a drink containing alcohol? 2 Filed at: 05/01/2022 0954   2  How many drinks containing alcohol do you have on a typical day you are drinking? 1 Filed at: 05/01/2022 0954   3a  Male UNDER 65: How often do you have five or more drinks on one occasion? 0 Filed at: 05/01/2022 0954   3b  FEMALE Any Age, or MALE 65+: How often do you have 4 or more drinks on one occassion? 0 Filed at: 05/01/2022 0954   Audit-C Score 3 Filed at: 05/01/2022 9047   PATTI: How many times in the past year have you    Used an illegal drug or used a prescription medication for non-medical reasons?  Never Filed at: 05/01/2022 3421                    MDM  Number of Diagnoses or Management Options  Left flank pain  Pyelonephritis  Diagnosis management comments: Patient presented with left flank pain   Differential diagnosis included but was not limited to pyelonephritis versus renal stone  CT negative for stone  Suspect patient may have already passed stone but patient did note UTI like symptoms leading up to today's flank pain  Patient clinically nontoxic  Tolerating p o  No leukocytosis or fever  Will treat with cefdinir for pyelonephritis  Recommend patient be re-evaluated by her PCP over the next week  Return precautions were advised, all questions were answered she was agreeable to plan and very thankful for care  Pain well controlled in the ED  Disposition  Final diagnoses:   Pyelonephritis   Left flank pain     Time reflects when diagnosis was documented in both MDM as applicable and the Disposition within this note     Time User Action Codes Description Comment    5/1/2022 11:00 AM Anna Catalan Add [N12] Pyelonephritis     5/1/2022 11:00 AM Anna Catalan Add [R10 9] Left flank pain       ED Disposition     ED Disposition Condition Date/Time Comment    Discharge Stable Sun May 1, 2022 11:00 AM Marixa Crandall discharge to home/self care              Follow-up Information     Follow up With Specialties Details Why Contact Info    Jersey Willett PA-C Family Medicine, Physician Assistant In 3 days  [address]  [address] [facility]            Patient's Medications   Discharge Prescriptions    CEFDINIR (OMNICEF) 300 MG CAPSULE    Take 1 capsule (300 mg total) by mouth every 12 (twelve) hours for 10 days       Start Date: 5/1/2022  End Date: 5/11/2022       Order Dose: 300 mg       Quantity: 20 capsule    Refills: 0    IBUPROFEN (MOTRIN) 600 MG TABLET    Take 1 tablet (600 mg total) by mouth every 6 (six) hours as needed for mild pain       Start Date: 5/1/2022  End Date: --       Order Dose: 600 mg       Quantity: 30 tablet    Refills: 0    ONDANSETRON (ZOFRAN ODT) 4 MG DISINTEGRATING TABLET    Take 1 tablet (4 mg total) by mouth every 6 (six) hours as needed for nausea or vomiting       Start Date: 5/1/2022  End Date: --       Order Dose: 4 mg       Quantity: 12 tablet    Refills: 0       No discharge procedures on file      PDMP Review     None          ED Provider  Electronically Signed by           Jose Boyer PA-C  05/01/22 1115

## 2022-05-02 ENCOUNTER — TELEPHONE (OUTPATIENT)
Dept: FAMILY MEDICINE CLINIC | Facility: CLINIC | Age: 37
End: 2022-05-02

## 2022-05-02 NOTE — TELEPHONE ENCOUNTER
Pt called, only identified herself as "Chio" and unpleased that she was seen at the ED yesterday for a kidney infection  She stated she was told if her back pain persists that she should go back to the ED or call PCP  She took pain medications but is still having bad back pain  Pt was calling to see if she should go back to ED or come here? I advised to Pt that I could not make that decsion for her, it would be up to her  I asked Pt if she could give me her  and last name as when she phoned in she only identified as "Saul Presume"  Pt provided both details  Pt stated and began to cry on the phone "I just feel something else is wrong and they may have misdiagnosed me " "I also was to [facility] and that was a waste of time "     Pt stated "I do not want to come there and then they tell me to go to ED and it becomes a waste of my time " I advised Pt that I can not guarantee that the Provider would send her back once an assessment here was in place, that I do not have those answers for her  I advised Pt that Frieda barnard appt was today at 3pm, that was not doable for Pt as she wanted something ASAP with Frieda Ruggiero, did not want to wait any longer as something is very wrong with her  I advised she could see someone else this morning but Pt was persistant as to why her 2 doses of pain medication did not provide her any relief? I tried to explain to Pt that it may take some time for the medication to kick in as she had only taken it 45 mins ago  I explained to patient that I am only at [facility] and according to her chart to f/u here in 3 Days with Frieda Ruggiero  I could see if she wanted to speak to a Clinical Member for more help/ in full details as to why her pain medication has not been working for her  Pt agreed  I called [facility] and neither staff members where present     I got back on the phone with Pt and advised her that unfortunately both Clinical Members are with patients as we have 3 Doctors here today I could either schedule this morning with another Physician or send a message to the Clinical Team but can not give a time frame of when they would call her back but it would be up to her what she would like me to do  Pt stated "[phone] [address] lechuga thank you anyway" and hung up

## 2022-05-03 ENCOUNTER — OFFICE VISIT (OUTPATIENT)
Dept: FAMILY MEDICINE CLINIC | Facility: CLINIC | Age: 37
End: 2022-05-03
Payer: COMMERCIAL

## 2022-05-03 VITALS
DIASTOLIC BLOOD PRESSURE: 78 MMHG | SYSTOLIC BLOOD PRESSURE: 120 MMHG | HEART RATE: 75 BPM | TEMPERATURE: 99.8 F | WEIGHT: 174 LBS | BODY MASS INDEX: 29.71 KG/M2 | HEIGHT: 64 IN | OXYGEN SATURATION: 99 %

## 2022-05-03 DIAGNOSIS — F33.8 SEASONAL DEPRESSION (HCC): ICD-10-CM

## 2022-05-03 DIAGNOSIS — N12 PYELONEPHRITIS: Primary | ICD-10-CM

## 2022-05-03 DIAGNOSIS — R10.9 LEFT FLANK PAIN: ICD-10-CM

## 2022-05-03 DIAGNOSIS — Z71.6 ENCOUNTER FOR SMOKING CESSATION COUNSELING: ICD-10-CM

## 2022-05-03 DIAGNOSIS — F41.9 ANXIETY: ICD-10-CM

## 2022-05-03 PROCEDURE — 3008F BODY MASS INDEX DOCD: CPT | Performed by: PHYSICIAN ASSISTANT

## 2022-05-03 PROCEDURE — 1036F TOBACCO NON-USER: CPT | Performed by: PHYSICIAN ASSISTANT

## 2022-05-03 PROCEDURE — 99214 OFFICE O/P EST MOD 30 MIN: CPT | Performed by: PHYSICIAN ASSISTANT

## 2022-05-03 RX ORDER — BUPROPION HYDROCHLORIDE 150 MG/1
150 TABLET ORAL EVERY MORNING
Qty: 90 TABLET | Refills: 1 | Status: SHIPPED | OUTPATIENT
Start: 2022-05-03

## 2022-05-03 RX ORDER — CIPROFLOXACIN 500 MG/1
500 TABLET, FILM COATED ORAL EVERY 12 HOURS SCHEDULED
Qty: 14 TABLET | Refills: 0 | Status: SHIPPED | OUTPATIENT
Start: 2022-05-03 | End: 2022-05-10

## 2022-05-03 NOTE — PROGRESS NOTES
ER Follow-up    Linh Scott 40 y o  female   Date:  5/3/2022      Assessment and Plan:    Aaron Echevarria was seen today for follow-up and back pain  Diagnoses and all orders for this visit:    Pyelonephritis  Comments:  UTI positive, culture still waiting on sensitivities; will switch antibiotic due to persistence and worsening UTI symptoms   Orders:  -     ciprofloxacin (CIPRO) 500 mg tablet; Take 1 tablet (500 mg total) by mouth every 12 (twelve) hours for 7 days    Left flank pain  Comments:  CT abd/pelvis reviewed - no stones, unremarkable; will continue ibuprofen 600 mg every 6 hrs prn, tylenol 1000 mg every 8 hrs prn    Encounter for smoking cessation counseling  Comments:  was able to quit smoking   Orders:  -     buPROPion (Wellbutrin XL) 150 mg 24 hr tablet; Take 1 tablet (150 mg total) by mouth every morning    Seasonal depression ([address] )  Comments:  well controlled on current wellbutrin, no changes  Orders:  -     buPROPion (Wellbutrin XL) 150 mg 24 hr tablet; Take 1 tablet (150 mg total) by mouth every morning    Anxiety  Comments:  well controlled on current wellbutrin, no changes  Orders:  -     buPROPion (Wellbutrin XL) 150 mg 24 hr tablet; Take 1 tablet (150 mg total) by mouth every morning           HPI:  Chief Complaint   Patient presents with    Follow-up     ER follow up 5/1/2022 [facility] [place] Patient is currently on ABX from ER    Back Pain      lower left side     HPI   Patient is a 39 yo female who presents for ER follow up  She was diagnosed with pyelonephritis and placed on cefdiner which she has not been taking for about 48 hrs  Her urine culture is positive but no sensitivities yet  She is having dysuria now and still having urinary pressure and discomfort in her lower back and L flank stabbing pain  She has been taking tylenol and ibuprofen to her pain  No fevers  No vomiting but is nauseous as of this morning  She has been able to eat okay  She had normal BM yesterday  No diarrhea   No back injury  She is about to get her period, wonders if having pre-period back pain which is not uncommon for her  She also gets constipated before periods  She is on OCP  She does request refill of wellbutrin - "marked improvement in mood"  She is tolerating it well, no changes necessary  ROS: Review of Systems   Constitutional: Negative for appetite change and fever  Respiratory: Negative  Cardiovascular: Negative  Gastrointestinal: Positive for nausea  Negative for diarrhea and vomiting  Constipation: BM normal yesterday  Genitourinary: Positive for dysuria, flank pain and urgency (pressure)  Musculoskeletal: Positive for back pain  Skin: Negative  Neurological: Negative  Past Medical History:   Diagnosis Date    Anxiety      Patient Active Problem List   Diagnosis    Lumbar disc herniation    Status post epidural steroid injection       Past Surgical History:   Procedure Laterality Date    INDUCED          Social History     Socioeconomic History    Marital status: Single     Spouse name: None    Number of children: None    Years of education: None    Highest education level: None   Occupational History    None   Tobacco Use    Smoking status: Former Smoker     Packs/day: 0 10     Types: Cigarettes    Smokeless tobacco: Never Used    Tobacco comment: smokes 1 cig per day plans to stop by summer   Vaping Use    Vaping Use: Never used   Substance and Sexual Activity    Alcohol use:  Yes     Alcohol/week: 2 0 standard drinks     Types: 2 Cans of beer per week     Comment: occasionally    Drug use: Yes     Types: Marijuana    Sexual activity: Yes     Partners: Female, Male     Birth control/protection: Condom Male   Other Topics Concern    None   Social History Narrative    None     Social Determinants of Health     Financial Resource Strain: Not on file   Food Insecurity: Not on file   Transportation Needs: Not on file   Physical Activity: Not on file   Stress: Not on file   Social Connections: Not on file   Intimate Partner Violence: Not on file   Housing Stability: Not on file       Family History   Problem Relation Age of Onset    Alcohol abuse Mother     Diabetes Mother     Hypertension Maternal Grandmother     Cancer Paternal Grandmother     Heart disease Paternal Grandfather        No Known Allergies      Current Outpatient Medications:     buPROPion (Wellbutrin XL) 150 mg 24 hr tablet, Take 1 tablet (150 mg total) by mouth every morning, Disp: 90 tablet, Rfl: 1    ibuprofen (MOTRIN) 600 mg tablet, Take 1 tablet (600 mg total) by mouth every 6 (six) hours as needed for mild pain, Disp: 30 tablet, Rfl: 0    Norethin-Eth Estrad-Fe Biphas (Lo Loestrin Fe) 1 MG-10 MCG / 10 MCG TABS, Take 1 tablet by mouth daily, Disp: 84 tablet, Rfl: 0    ondansetron (Zofran ODT) 4 mg disintegrating tablet, Take 1 tablet (4 mg total) by mouth every 6 (six) hours as needed for nausea or vomiting, Disp: 12 tablet, Rfl: 0    ciprofloxacin (CIPRO) 500 mg tablet, Take 1 tablet (500 mg total) by mouth every 12 (twelve) hours for 7 days, Disp: 14 tablet, Rfl: 0      Physical Exam:  /78 (BP Location: Left arm, Patient Position: Sitting, Cuff Size: Standard)   Pulse 75   Temp 99 8 °F (37 7 °C) (Tympanic)   Ht 5' 4" (1 626 m)   Wt 78 9 kg (174 lb)   SpO2 99%   BMI 29 87 kg/m²     Physical Exam  Constitutional:       General: She is not in acute distress  Appearance: Normal appearance  HENT:      Head: Normocephalic and atraumatic  Right Ear: External ear normal       Left Ear: External ear normal    Eyes:      Conjunctiva/sclera: Conjunctivae normal    Cardiovascular:      Rate and Rhythm: Normal rate and regular rhythm  Heart sounds: No murmur heard  Pulmonary:      Effort: Pulmonary effort is normal  No respiratory distress  Breath sounds: Normal breath sounds  No wheezing  Abdominal:      General: Bowel sounds are normal  There is no distension  Palpations: Abdomen is soft  Tenderness: There is no abdominal tenderness  There is left CVA tenderness  There is no right CVA tenderness or guarding  Skin:     General: Skin is warm and dry  Neurological:      General: No focal deficit present  Mental Status: She is alert and oriented to person, place, and time     Psychiatric:         Mood and Affect: Mood normal          Behavior: Behavior normal            Labs:  Lab Results   Component Value Date    WBC 7 37 05/01/2022    HGB 14 0 05/01/2022    HCT 41 9 05/01/2022    MCV 91 05/01/2022     05/01/2022     Lab Results   Component Value Date    K 4 4 05/01/2022     05/01/2022    CO2 25 05/01/2022    BUN 12 05/01/2022    CREATININE 0 71 05/01/2022    CALCIUM 9 1 05/01/2022    AST 8 (L) 05/01/2022    ALT 9 05/01/2022    ALKPHOS 39 05/01/2022    EGFR 109 05/01/2022

## 2022-05-04 LAB
BACTERIA UR CULT: ABNORMAL
BACTERIA UR CULT: ABNORMAL

## 2022-06-27 NOTE — PROGRESS NOTES
Assessment Diagnoses and all orders for this visit:    Family planning, BCP maintenance  -     Norethin-Eth Estrad-Fe Biphas (Lo Loestrin Fe) 1 MG-10 MCG / 10 MCG TABS; Take 1 tablet by mouth daily  -     Risk/benefits, side effects and administration reviewed  Questions asked and answered  Verbalizes understanding    -     Return to office for yearly exam  3/ 2023    BCP (birth control pills) initiation        Plan  40 y o  female continuing OCP (estrogen/progesterone)  Subjective      Renata Valdez is a 40 y o  female who presents for contraception counseling  The patient does not have complaints today  The patient is sexually active  Pertinent past medical history: none  Rare tobacco use  Sleeps 6-7 hrs daily  Hikes and walking weekly  Patient Active Problem List   Diagnosis    Lumbar disc herniation    Status post epidural steroid injection       Past Medical History:   Diagnosis Date    Anxiety        Past Surgical History:   Procedure Laterality Date    INDUCED          Family History   Problem Relation Age of Onset    Alcohol abuse Mother     Diabetes Mother     Hypertension Maternal Grandmother     Cancer Paternal Grandmother     Heart disease Paternal Grandfather        Social History     Socioeconomic History    Marital status: Single     Spouse name: Not on file    Number of children: Not on file    Years of education: Not on file    Highest education level: Not on file   Occupational History    Not on file   Tobacco Use    Smoking status: Former Smoker     Packs/day: 0 10     Types: Cigarettes    Smokeless tobacco: Never Used    Tobacco comment: smokes 1 cig per day plans to stop by summer   Vaping Use    Vaping Use: Never used   Substance and Sexual Activity    Alcohol use:  Yes     Alcohol/week: 2 0 standard drinks     Types: 2 Cans of beer per week     Comment: occasionally    Drug use: Yes     Types: Marijuana    Sexual activity: Yes     Partners: Female, Male Birth control/protection: Condom Male, OCP   Other Topics Concern    Not on file   Social History Narrative    Not on file     Social Determinants of Health     Financial Resource Strain: Not on file   Food Insecurity: Not on file   Transportation Needs: Not on file   Physical Activity: Not on file   Stress: Not on file   Social Connections: Not on file   Intimate Partner Violence: Not on file   Housing Stability: Not on file          Current Outpatient Medications:     buPROPion (Wellbutrin XL) 150 mg 24 hr tablet, Take 1 tablet (150 mg total) by mouth every morning, Disp: 90 tablet, Rfl: 1    ibuprofen (MOTRIN) 600 mg tablet, Take 1 tablet (600 mg total) by mouth every 6 (six) hours as needed for mild pain, Disp: 30 tablet, Rfl: 0    Norethin-Eth Estrad-Fe Biphas (Lo Loestrin Fe) 1 MG-10 MCG / 10 MCG TABS, Take 1 tablet by mouth daily, Disp: 84 tablet, Rfl: 2    ondansetron (Zofran ODT) 4 mg disintegrating tablet, Take 1 tablet (4 mg total) by mouth every 6 (six) hours as needed for nausea or vomiting, Disp: 12 tablet, Rfl: 0    No Known Allergies    Review of Systems  Constitutional :no fever, feels well, no tiredness, no recent weight gain or loss  ENT: no ear ache, no loss of hearing, no nosebleeds or nasal discharge, no sore throat or hoarseness  Cardiovascular: no complaints of slow or fast heart beat, no chest pain, no palpitations, no leg claudication or lower extremity edema  Respiratory: no complaints of shortness of shortness of breath, no POND  Breasts:no complaints of breast pain, breast lump, or nipple discharge  Gastrointestinal: no complaints of abdominal pain, constipation, nausea, vomiting, or diarrhea or bloody stools  Genitourinary : no complaints of dysuria, incontinence, pelvic pain, no dysmenorrhea, vaginal discharge or abnormal vaginal bleeding and as noted in HPI    Musculoskeletal: no complaints of arthralgia, no myalgia, no joint swelling or stiffness, no limb pain or swelling  Integumentary: no complaints of skin rash or lesion, itching or dry skin  Neurological: no complaints of headache, no confusion, no numbness or tingling, no dizziness or fainting    Objective     /70   Ht 5' 4" (1 626 m)   Wt 79 6 kg (175 lb 6 4 oz)   LMP 05/31/2022 (Exact Date)   BMI 30 11 kg/m²     General:   appears stated age, cooperative, alert normal mood and affect   Neck: normal, supple,trachea midline, no masses   Heart: regular rate and rhythm, S1, S2 normal, no murmur, click, rub or gallop   Lungs: clear to auscultation bilaterally   Skin normal skin turgor and no rashes     Psychiatric orientation to person, place, and time: normal  mood and affect: normal

## 2022-06-28 ENCOUNTER — OFFICE VISIT (OUTPATIENT)
Dept: OBGYN CLINIC | Facility: CLINIC | Age: 37
End: 2022-06-28
Payer: COMMERCIAL

## 2022-06-28 VITALS
SYSTOLIC BLOOD PRESSURE: 108 MMHG | WEIGHT: 175.4 LBS | DIASTOLIC BLOOD PRESSURE: 70 MMHG | HEIGHT: 64 IN | BODY MASS INDEX: 29.94 KG/M2

## 2022-06-28 DIAGNOSIS — Z30.41 FAMILY PLANNING, BCP MAINTENANCE: Primary | ICD-10-CM

## 2022-06-28 DIAGNOSIS — Z30.011 BCP (BIRTH CONTROL PILLS) INITIATION: ICD-10-CM

## 2022-06-28 PROCEDURE — 1036F TOBACCO NON-USER: CPT | Performed by: ADVANCED PRACTICE MIDWIFE

## 2022-06-28 PROCEDURE — 99213 OFFICE O/P EST LOW 20 MIN: CPT | Performed by: ADVANCED PRACTICE MIDWIFE

## 2022-06-28 PROCEDURE — 3008F BODY MASS INDEX DOCD: CPT | Performed by: ADVANCED PRACTICE MIDWIFE

## 2022-06-28 RX ORDER — NORETHINDRONE ACETATE AND ETHINYL ESTRADIOL, ETHINYL ESTRADIOL AND FERROUS FUMARATE 1MG-10(24)
1 KIT ORAL DAILY
Qty: 84 TABLET | Refills: 2 | Status: SHIPPED | OUTPATIENT
Start: 2022-06-28

## 2022-07-18 ENCOUNTER — TELEMEDICINE (OUTPATIENT)
Dept: FAMILY MEDICINE CLINIC | Facility: CLINIC | Age: 37
End: 2022-07-18
Payer: COMMERCIAL

## 2022-07-18 ENCOUNTER — TELEPHONE (OUTPATIENT)
Dept: FAMILY MEDICINE CLINIC | Facility: CLINIC | Age: 37
End: 2022-07-18

## 2022-07-18 DIAGNOSIS — L03.115 CELLULITIS OF RIGHT LOWER EXTREMITY: Primary | ICD-10-CM

## 2022-07-18 DIAGNOSIS — T63.304A SPIDER BITE WOUND, UNDETERMINED INTENT, INITIAL ENCOUNTER: ICD-10-CM

## 2022-07-18 PROCEDURE — 99213 OFFICE O/P EST LOW 20 MIN: CPT | Performed by: PHYSICIAN ASSISTANT

## 2022-07-18 RX ORDER — CEPHALEXIN 500 MG/1
500 CAPSULE ORAL EVERY 8 HOURS SCHEDULED
Qty: 30 CAPSULE | Refills: 0 | Status: SHIPPED | OUTPATIENT
Start: 2022-07-18 | End: 2022-07-18 | Stop reason: SDUPTHER

## 2022-07-18 RX ORDER — CEPHALEXIN 500 MG/1
500 CAPSULE ORAL EVERY 12 HOURS SCHEDULED
Qty: 20 CAPSULE | Refills: 0 | Status: SHIPPED | OUTPATIENT
Start: 2022-07-18 | End: 2022-07-28

## 2022-07-18 NOTE — PROGRESS NOTES
Virtual Regular Visit    Verification of patient location:    Patient is located in the following state in which I hold an active license [place]      Assessment/Plan:    Problem List Items Addressed This Visit    None     Visit Diagnoses     Cellulitis of right lower extremity    -  Primary    fermín area of redness to monitor improvement/worsening     Relevant Medications    cephalexin (KEFLEX) 500 mg capsule    Spider bite wound, undetermined intent, initial encounter        Relevant Medications    cephalexin (KEFLEX) 500 mg capsule               Reason for visit is   Chief Complaint   Patient presents with    Virtual Regular Visit        Encounter provider Nasima Perez PA-C    Provider located at [address] [place] [id number]      Recent Visits  No visits were found meeting these conditions  Showing recent visits within past 7 days and meeting all other requirements  Today's Visits  Date Type Provider Dept   07/18/22 Telemedicine Nasima Perez PA-C [facility] [address] [place] [address] today's visits and meeting all other requirements  Future Appointments  No visits were found meeting these conditions  Showing future appointments within next 150 days and meeting all other requirements       The patient was identified by name and date of birth  Celestino Cockayne was informed that this is a telemedicine visit and that the visit is being conducted through [address] [place] [address] and patient was informed that this is a secure, HIPAA-compliant platform  She agrees to proceed     My office door was closed  No one else was in the room  She acknowledged consent and understanding of privacy and security of the video platform  The patient has agreed to participate and understands they can discontinue the visit at any time  Patient is aware this is a billable service       Subjective  Celestino Cockayne is a 40 y o  female who presents for same day appt due to concern for spider bite  HPI   She was kneeling the grass gardening and noticed a bite on R leg, shin  Her shin was on fire and there was a red dot and then over the course of 3 days has gotten more red and swollen  She has been taking benadryl  Yesterday she had pain when walking or putting pressure on it but better today  It is warm to touch  No oozing, not open  But redness is spreading down shin  Past Medical History:   Diagnosis Date    Anxiety        Past Surgical History:   Procedure Laterality Date    INDUCED          Current Outpatient Medications   Medication Sig Dispense Refill    cephalexin (KEFLEX) 500 mg capsule Take 1 capsule (500 mg total) by mouth every 12 (twelve) hours for 10 days 20 capsule 0    buPROPion (Wellbutrin XL) 150 mg 24 hr tablet Take 1 tablet (150 mg total) by mouth every morning 90 tablet 1    ibuprofen (MOTRIN) 600 mg tablet Take 1 tablet (600 mg total) by mouth every 6 (six) hours as needed for mild pain 30 tablet 0    Norethin-Eth Estrad-Fe Biphas (Lo Loestrin Fe) 1 MG-10 MCG / 10 MCG TABS Take 1 tablet by mouth daily 84 tablet 2    ondansetron (Zofran ODT) 4 mg disintegrating tablet Take 1 tablet (4 mg total) by mouth every 6 (six) hours as needed for nausea or vomiting 12 tablet 0     No current facility-administered medications for this visit  No Known Allergies    Review of Systems   Constitutional: Negative for fever  Skin: Positive for color change  Video Exam    There were no vitals filed for this visit  Physical Exam  Constitutional:       General: She is not in acute distress  Appearance: She is not ill-appearing  Skin:     General: Skin is warm and dry  Findings: Erythema present  Neurological:      Mental Status: She is alert  I spent 7 minutes directly with the patient during this visit     [facility] verbally agrees to participate in [employer]  Pt is aware that [employer] could be limited without vital signs or the ability to perform a full hands-on physical David Ponce understands she or the provider may request at any time to terminate the video visit and request the patient to seek care or treatment in person

## 2022-07-18 NOTE — TELEPHONE ENCOUNTER
Pharmacy called he doesn't know which medication to administer on the cephalexin  He need clarification and  Directions  As per pharmacy  Please advise   Thank you

## 2022-08-08 ENCOUNTER — TELEPHONE (OUTPATIENT)
Dept: FAMILY MEDICINE CLINIC | Facility: CLINIC | Age: 37
End: 2022-08-08

## 2022-08-08 NOTE — TELEPHONE ENCOUNTER
Patient called and stated that she has had  intermittent stomach cramps which hasn't gone away and has not had a solid bowel movement since starting the Keflex which was prescribed from a virtual visit on 07/18  Her last dose was Friday the 29th  She isn't sure if there is something else going on and has been eating a lot of yogurt in hopes that would help  She didn't want to set up an appt as it is $50 each time she comes in, but will if that is what the provider wants her to do

## 2022-08-09 NOTE — TELEPHONE ENCOUNTER
Called and informed patient  Patient states that she had some probiotic yogurt yesterday  Has had a solid bowel movement and abdominal cramping has subsided  Patient will monitor if symptoms keep improving  Patient will contact office back to schedule if symptoms do not improve or worsen

## 2022-10-27 ENCOUNTER — TELEPHONE (OUTPATIENT)
Dept: OBGYN CLINIC | Facility: HOSPITAL | Age: 37
End: 2022-10-27

## 2022-10-27 ENCOUNTER — OFFICE VISIT (OUTPATIENT)
Dept: FAMILY MEDICINE CLINIC | Facility: CLINIC | Age: 37
End: 2022-10-27

## 2022-10-27 VITALS
SYSTOLIC BLOOD PRESSURE: 118 MMHG | DIASTOLIC BLOOD PRESSURE: 84 MMHG | TEMPERATURE: 97.5 F | BODY MASS INDEX: 29.88 KG/M2 | HEART RATE: 94 BPM | WEIGHT: 175 LBS | OXYGEN SATURATION: 98 % | HEIGHT: 64 IN

## 2022-10-27 DIAGNOSIS — G56.01 RIGHT CARPAL TUNNEL SYNDROME: Primary | ICD-10-CM

## 2022-10-27 NOTE — PROGRESS NOTES
Name: Timi Coley      : 1985      MRN: [id number]  Encounter Provider: Mar Johnson DO  Encounter Date: 10/27/2022   Encounter department: [address] [place] [address]  Right carpal tunnel syndrome  -     Ambulatory Referral to Hand Surgery;  Future  -     Cock Up Wrist Splint           Subjective      Chief Complaint   Patient presents with   • Carpal Tunnel     Possible carpal tunnel on the right side, right wrist pain and has noticed some weakness with her right hand, has been wearing a brace at night        Pt here for new problem visit  C/o right hand and wrist pain since the spring - getting worse  This is the first time I am seeing this pt- she follows with other provider here who is on leave  States started 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- working on computer using mouse, brought it up to [place] and she suggested wearing a brace (chart review shows assessed at telemed visit 2022) once tax season was over, it got better, but never gone totally away, there are days that I don't feel any pain, 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 and now 45min to an hour starts to hurt and pinching/grabbing things, opening cans I have a lot of difficulty, and the last couple of months noticing definitely a decrease in hand strength, and Tuesday couldn't even bend my hand back - I wear a brace at night and use topical THC at night- helps with the pain, temporarily  Never any sx left hand/wrist "I'm using my left hand more, because I don't feel the weakness in my left hand" vs right feeling weak  Admits index finger and thumb tips gets "tingly" usually when I'm sewing"  Pt is tearful when discussing possibility of being out of work if surgery is necessary  I ask and pt answers that she got the wrist online- ordered on  E [facility] [address] - not cocked up    Review of Systems    Current Outpatient Medications on File Prior to Visit   Medication Sig   • buPROPion (Wellbutrin XL) 150 mg 24 hr tablet Take 1 tablet (150 mg total) by mouth every morning   • ibuprofen (MOTRIN) 600 mg tablet Take 1 tablet (600 mg total) by mouth every 6 (six) hours as needed for mild pain   • ondansetron (Zofran ODT) 4 mg disintegrating tablet Take 1 tablet (4 mg total) by mouth every 6 (six) hours as needed for nausea or vomiting   • [DISCONTINUED] Norethin-Eth Estrad-Fe Biphas (Lo Loestrin Fe) 1 MG-10 MCG / 10 MCG TABS Take 1 tablet by mouth daily       Objective     /84 (BP Location: Left arm, Patient Position: Sitting, Cuff Size: Standard)   Pulse 94   Temp 97 5 °F (36 4 °C) (Tympanic)   Ht 5' 4" (1 626 m)   Wt 79 4 kg (175 lb)   SpO2 98%   BMI 30 04 kg/m²     Physical Exam  Constitutional:       General: She is not in acute distress  Appearance: She is well-developed  She is not ill-appearing, toxic-appearing or diaphoretic  HENT:      Head: Normocephalic and atraumatic  Mouth/Throat:      Pharynx: Uvula midline  Neck:      Trachea: Phonation normal    Pulmonary:      Effort: Pulmonary effort is normal    Musculoskeletal:      Right wrist: Tenderness present  No swelling, deformity, effusion, bony tenderness or crepitus  Decreased range of motion  Left wrist: Normal       Comments: +Tinnel's right, negative Rajat's   Skin:     General: Skin is warm and dry  Coloration: Skin is not pale  Neurological:      Mental Status: She is alert and oriented to person, place, and time  Sensory: Sensation is intact  Motor: Motor function is intact  Psychiatric:         Mood and Affect: Mood normal          Behavior: Behavior normal  Behavior is cooperative         Mar Johnson DO

## 2022-10-27 NOTE — TELEPHONE ENCOUNTER
Hello,  Please advise if the following patient can be forced onto the schedule:    Patient: Angelica Troncoso    : 1985    MRN: [id number]    Call back #: [phone] (she will answer as [employer])    Insurance: [facility]    Reason for appointment: Right carpal tunnel syndrome    Requested doctor/location: Requested Sarika Mansfield but may be willing to see another specialist      Thank you

## 2022-10-31 ENCOUNTER — OFFICE VISIT (OUTPATIENT)
Dept: OBGYN CLINIC | Facility: CLINIC | Age: 37
End: 2022-10-31

## 2022-10-31 VITALS
HEIGHT: 64 IN | DIASTOLIC BLOOD PRESSURE: 77 MMHG | HEART RATE: 71 BPM | SYSTOLIC BLOOD PRESSURE: 121 MMHG | BODY MASS INDEX: 29.88 KG/M2 | WEIGHT: 175 LBS

## 2022-10-31 DIAGNOSIS — M65.4 DE QUERVAIN'S TENOSYNOVITIS, RIGHT: ICD-10-CM

## 2022-10-31 DIAGNOSIS — M25.531 RIGHT WRIST PAIN: Primary | ICD-10-CM

## 2022-10-31 RX ORDER — LIDOCAINE HYDROCHLORIDE 10 MG/ML
0.5 INJECTION, SOLUTION INFILTRATION; PERINEURAL
Status: COMPLETED | OUTPATIENT
Start: 2022-10-31 | End: 2022-10-31

## 2022-10-31 RX ORDER — METHYLPREDNISOLONE ACETATE 80 MG/ML
0.5 INJECTION, SUSPENSION INTRA-ARTICULAR; INTRALESIONAL; INTRAMUSCULAR; SOFT TISSUE
Status: COMPLETED | OUTPATIENT
Start: 2022-10-31 | End: 2022-10-31

## 2022-10-31 RX ADMIN — LIDOCAINE HYDROCHLORIDE 0.5 ML: 10 INJECTION, SOLUTION INFILTRATION; PERINEURAL at 10:23

## 2022-10-31 RX ADMIN — METHYLPREDNISOLONE ACETATE 0.5 ML: 80 INJECTION, SUSPENSION INTRA-ARTICULAR; INTRALESIONAL; INTRAMUSCULAR; SOFT TISSUE at 10:23

## 2022-10-31 NOTE — PROGRESS NOTES
Orthopaedic Surgery - Office Note  Farrukh Mccray (2 y o  female)   : 1985   MRN: [id number]  Encounter Date: 10/31/2022    Chief Complaint   Patient presents with   • Right Hand - Pain         Assessment/Plan  Diagnoses and all orders for this visit:    Right wrist pain  -     Ambulatory Referral to Occupational Therapy; Future  -     Durable Medical Equipment    De Quervain's tenosynovitis, right  -     Ambulatory Referral to Hand Surgery  -     Ambulatory Referral to Occupational Therapy; Future  -     Durable Medical Equipment    Other orders  -     Medium joint arthrocentesis         Return for Recheck in 4 weeks with hand surgery  History of Present Illness  This is a new patient with right wrist pain that have been ongoing since 3-2022  She has been using a wrist splint at night without relief(ordered from [facility])  Her pcp has since given her a cock up wrist splint  She has associated weakness in the gripping with the right hand secondary to the pain  They symptoms started during tax season and she has been using the brace since then and symptoms are worsening  Crafting and crocheting increase her symptoms  The pain is located at the base of the thumb and radiates into the dorsal compartment  No parasthesias are reported  She denies any numbness or tingling  She denies any symptoms in the index or middle finger  She has not had any trauma  Review of Systems  Pertinent items are noted in HPI  All other systems were reviewed and are negative  Physical Exam  /77   Pulse 71   Ht 5' 4" (1 626 m)   Wt 79 4 kg (175 lb)   BMI 30 04 kg/m²   Cons: Appears well  No apparent distress  Psych: Alert  Oriented x3  Mood and affect normal   Eyes: PERRLA, EOMI  Resp: Normal effort  No audible wheezing or stridor  CV: Palpable pulse  No discernable arrhythmia  Lymph:  No palpable cervical, axillary, or inguinal lymphadenopathy  Skin: Warm  No palpable masses    No visible lesions  Neuro: Normal muscle tone  Normal and symmetric DTR's  Right Hand Exam   Right hand exam is normal     Range of Motion   The patient has normal right wrist ROM  Muscle Strength   The patient has normal right wrist strength  Tests   Phalen’s Sign: negative  Tinel's sign (median nerve): negative  Finkelstein's test: positive    Other   Erythema: absent  Scars: absent  Sensation: normal  Pulse: present    Comments:  Patient has no thenar atrophy  Her  strength and pinch strength is 5/5, but she has pain in first dorsal compartment while attempting  She has no anatomical snuff box tenderness  She has no cmc tenderness and negative grind test                 Studies Reviewed  pcp notes from 10-27-22 and telemedicine notes reviewed  xrays not indicated  Medium joint arthrocentesis  Universal Protocol:  Consent: Verbal consent obtained  Risks and benefits: risks, benefits and alternatives were discussed  Consent given by: patient  Time out: Immediately prior to procedure a "time out" was called to verify the correct patient, procedure, equipment, support staff and site/side marked as required  Patient understanding: patient states understanding of the procedure being performed  Patient consent: the patient's understanding of the procedure matches consent given  Relevant documents: relevant documents present and verified  Test results: test results available and properly labeled  Site marked: the operative site was marked  Radiology Images displayed and confirmed  If images not available, report reviewed: imaging studies available  Patient identity confirmed: verbally with patient    Supporting Documentation  Indications: pain   Procedure Details  Location: wrist - Wrist joint: de quervain's    Preparation: Patient was prepped and draped in the usual sterile fashion  Needle size: 25 G  Ultrasound guidance: no  Medications administered: 0 5 mL lidocaine 1 %; 0 5 mL methylPREDNISolone acetate 80 mg/mL    Patient tolerance: patient tolerated the procedure well with no immediate complications  Dressing:  Sterile dressing applied    Patient noted near complete resolution of her symptoms shortly after the injection was completed and prior to leaving exam room        Medical, Surgical, Family, and Social History  The patient's medical history, family history, and social history, were reviewed and updated as appropriate  Past Medical History:   Diagnosis Date   • Anxiety        Past Surgical History:   Procedure Laterality Date   • INDUCED          Family History   Problem Relation Age of Onset   • Alcohol abuse Mother    • Diabetes Mother    • Hypertension Maternal Grandmother    • Cancer Paternal Grandmother    • Heart disease Paternal Grandfather        Social History     Occupational History   • Not on file   Tobacco Use   • Smoking status: Former Smoker     Packs/day: 0 10     Types: Cigarettes   • Smokeless tobacco: Never Used   • Tobacco comment: smokes 1 cig per day plans to stop by summer   Vaping Use   • Vaping Use: Never used   Substance and Sexual Activity   • Alcohol use:  Yes     Alcohol/week: 2 0 standard drinks     Types: 2 Cans of beer per week     Comment: occasionally   • Drug use: Yes     Types: Marijuana   • Sexual activity: Yes     Partners: Female, Male     Birth control/protection: Condom Male, OCP       No Known Allergies      Current Outpatient Medications:   •  buPROPion (Wellbutrin XL) 150 mg 24 hr tablet, Take 1 tablet (150 mg total) by mouth every morning, Disp: 90 tablet, Rfl: 1  •  ibuprofen (MOTRIN) 600 mg tablet, Take 1 tablet (600 mg total) by mouth every 6 (six) hours as needed for mild pain, Disp: 30 tablet, Rfl: 0  •  ondansetron (Zofran ODT) 4 mg disintegrating tablet, Take 1 tablet (4 mg total) by mouth every 6 (six) hours as needed for nausea or vomiting, Disp: 12 tablet, Rfl: 0      Mihir Oneill PA-C

## 2022-10-31 NOTE — PATIENT INSTRUCTIONS
Marguerite Asa Disease   WHAT YOU NEED TO KNOW:   Mingo Lax disease is inflammation of the tendons on the thumb side of your wrist  Tendons are thick strands of tissue that connect muscles to bones  DISCHARGE INSTRUCTIONS:   Splint or brace: These devices will help decrease pain, limit movement, and protect your wrist so that it can heal  Make sure your device is comfortable  If it is too tight, your fingers may feel numb or tingly  Do not push or lean on your device because it can break  Physical or occupational therapy:  You may need to see a physical or occupational therapist to teach you special exercises  These exercises help improve movement and decrease pain  They also help improve strength and decrease your risk for loss of function  Therapists will help you make changes to your daily activities to decrease stress and pressure on the tendons  Rest:  Rest your injured thumb or wrist  Avoid twisting, grasping, or gripping movements  Ask when you can return to your normal activities  Medicines:   NSAIDs:  These medicines decrease swelling, pain, and fever  They are available without a doctor's order  Ask which medicine is right for you, and how much to take  Take as directed  NSAIDs can cause stomach bleeding or kidney problems if not taken correctly  Take your medicine as directed  Contact your healthcare provider if you think your medicine is not helping or if you have side effects  Tell him of her if you are allergic to any medicine  Keep a list of the medicines, vitamins, and herbs you take  Include the amounts, and when and why you take them  Bring the list or the pill bottles to follow-up visits  Carry your medicine list with you in case of an emergency  Follow up with your doctor as directed:  Write down your questions so you remember to ask them during your visits  Contact your healthcare provider if:   Your splint or brace is too tight and you cannot loosen it      You have a Spoke with mrs lam see result note.    fever     Your pain and swelling get worse or do not go away  Your cannot grasp objects because of the pain and swelling  You have questions or concerns about your condition or care  Return to the emergency department if:   You cannot move your thumb or wrist     Your fingers feel numb, tingly, cool to the touch, or look blue or pale  © Copyright [employer] 2022 Information is for End User's use only and may not be sold, redistributed or otherwise used for commercial purposes  All illustrations and images included in CareNotes® are the copyrighted property of [employer]  or [facility]   The above information is an  only  It is not intended as medical advice for individual conditions or treatments  Talk to your doctor, nurse or pharmacist before following any medical regimen to see if it is safe and effective for you

## 2022-11-07 ENCOUNTER — EVALUATION (OUTPATIENT)
Dept: OCCUPATIONAL THERAPY | Facility: CLINIC | Age: 37
End: 2022-11-07

## 2022-11-07 DIAGNOSIS — M25.531 RIGHT WRIST PAIN: Primary | ICD-10-CM

## 2022-11-07 DIAGNOSIS — M65.4 DE QUERVAIN'S TENOSYNOVITIS, RIGHT: ICD-10-CM

## 2022-11-07 NOTE — PROGRESS NOTES
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  Patient reports difficulty opening jars and bags  Patient initially saw PCP on 10/27/2022  Patient was referred to Ortho and had her initial appointment on 10/31/2022  Patient has an additional follow-up with Ortho on 2022  Patient had a CSI completed on 10/31/2022 with a relief noted  Patient was initially instructed to wear wrist brace a night to address CTS  However, patient was then given a thumb/wrist immobilizer at Ortho appointment to address DeQuervain symptoms  Patient has not had any diagnostic testing since start of treatment  Impairments: abnormal or restricted ROM, activity intolerance, impaired physical strength and pain with function    Symptom irritability: moderateUnderstanding of Dx/Px/POC: good   Prognosis: good    Goals  STGs    Pt will increase  strength by 5-10#  - Not Met    Pt will increase wrist strength by 1/2 grade  - Not Met    Pt will report no more than 4/10 pain with activity  - Not Met    Independent with HEP  - Not Met    Patient will verbalize understanding of splint rationale  - Met    Patient will demonstrate the ability to don and doff splint with I  - Met    Patient will verbalize understanding of splint care  - Met    Patient will verbalize understanding of splint wear schedule   - Met      LTGs     Pt will increase  strength by an additional 5-10#  - Not Met    Pt will increase wrist strength by 1-2 grade  - Not Met    Pt will report a resolution of pain with special tests  - Not Met    Pt will increase pinch strength by 3-5#  - Not Met    Pt will report an increase in work participation  - Not Met    Pt will report no more than 2/10 pain with activity  - Not Met        Plan  Plan details: Patient has presenting to OP OT services with a dx of right DeQuervains  Patient demonstrating increased pain, decreased strength, decreased ROM and decreased activity  Pt would benefit from continued Occupational Therapy services one to two times per week for 4 weeks to return to prior level of function and achieve all established goals  Thank you for the referral!    Patient would benefit from: OT eval and skilled occupational therapy  Referral necessary: Yes  Planned modality interventions: ultrasound, thermotherapy: hydrocollator packs, unattended electrical stimulation and cryotherapy  Planned therapy interventions: joint mobilization, manual therapy, massage, patient education, therapeutic activities, therapeutic exercise, stretching, strengthening, self care, home exercise program and fine motor coordination training  Frequency: 1x week  Duration in visits: 4  Duration in weeks: 4  Treatment plan discussed with: patient        Subjective Evaluation    History of Present Illness  Onset date: April            Not a recurrent problem   Quality of life: good    Pain  Current pain ratin  At best pain ratin  At worst pain rating: 10  Location: Right Thumb  Quality: sharp    Social Support    Employment status: working  Hand dominance: right    Treatments  Current treatment: occupational therapy  Patient Goals  Patient goals for therapy: decreased pain, increased motion, increased strength and independence with ADLs/IADLs          Objective     Neurological Testing     Sensation     Wrist/Hand   Left   Intact: light touch    Right   Intact: light touch    Active Range of Motion     Left Wrist   Normal active range of motion    Right Wrist   Wrist flexion: 52 degrees   Wrist extension: 50 degrees   Radial deviation: 20 degrees with pain  Ulnar deviation: 40 degrees     Left Thumb   Opposition: WNL    Right Thumb   Opposition: WNL    Additional Active Range of Motion Details  Slight decrease in ROM of right hand compared to norms  Composite fist noted  Patient has pain with thumb movements  Strength/Myotome Testing     Left Wrist/Hand   Normal wrist strength     (2nd hand position)     Trial 1: 55    Thumb Strength  Key/Lateral Pinch     Trial 1: 13    Right Wrist/Hand   Wrist extension: 3-  Wrist flexion: 3-  Radial deviation: 3-  Ulnar deviation: 3+     (2nd hand position)     Trial 1: 65    Thumb Strength   Key/Lateral Pinch     Trial 1: 5    Additional Strength Details  Patient demonstrating with decreased pinch strength as compared to left with pain noted    Tests     Right Wrist/Hand   Positive Finkelstein's, Phalen's sign and Tinel's sign (medial nerve)  Negative CMC grind and Froment's sign  Swelling     Left Wrist/Hand   Circumference wrist: 16 cm    Right Wrist/Hand   Circumference wrist: 16 cm  Neuro Exam:     Functional outcomes   Left 9 peg hole test: 18 21 (seconds)  Right 9 hole peg test: 18 79 (seconds)       Patient tolerated session well  Patient and therapist discussed exercises as per initial HEP  Patient verbalized understanding of education and demonstrated proper technique  Therapist will make increases as tolerated   Continue with POC               Precautions Right Lara's  Initial Evaluation completed on: 11/7/2022  Re-Evaluation needs to be completed before: 12/7/2022  Insurance: [employer]  FOTO: 11/07/2022  Auth Visits: N/A          Manuals 11/07/2022       Wrist Flexor Stretch        Manual Massage        IASTM   Flexor Mass  Dorsal Thumb     GT #6  GT #3               Neuro Re-Ed  11/07/2022                                                               Ther Ex 11/07/2022 Prayer Stretch        Wrist AROM  Flex/Ext  RD/UD     HEP        Wrist Maze        Digi-Flex        Hand Power Pro        Flex Bar   Twists  Bends        Wrist Extensor Stretch HEP       Thumb Radial Deviation Stretch HEP       Thumb Extension Stretch HEP               Ther Activity 11/07/2022       Tension Pin Pom Pom        Grooved Peg Board                                Modalities 11/07/2022       Paraffin Performed at end of session w/ MH       Ultrasound  Thumb 3 3 MHz  50% rate  0 8 intensity       Splint Fabrication Thumb SPICA Splint

## 2022-11-14 ENCOUNTER — OFFICE VISIT (OUTPATIENT)
Dept: OCCUPATIONAL THERAPY | Facility: CLINIC | Age: 37
End: 2022-11-14

## 2022-11-14 DIAGNOSIS — M65.4 DE QUERVAIN'S TENOSYNOVITIS, RIGHT: ICD-10-CM

## 2022-11-14 DIAGNOSIS — M25.531 RIGHT WRIST PAIN: Primary | ICD-10-CM

## 2022-11-14 NOTE — PROGRESS NOTES
Daily Note     Today's date: 2022  Patient name: Bettie Nguyen  : 1985  MRN: [id number]  Referring provider: UZAIR Polo*  Dx:   Encounter Diagnosis     ICD-10-CM    1  Right wrist pain  M25 531    2  De Quervain's tenosynovitis, right  M65 4                   Subjective: "I have a clicking in my wrist now "      Objective: See treatment diary below      Assessment: Tolerated treatment well  Patient exhibited good technique with therapeutic exercises and would benefit from continued OT  Patient reports compliance with splint wear and stretches  Patient reports increased clicking in wrist and is presenting with signs of SL instability  Therapist completed KT Tape to assist and stabilize wrist instability  Plan: Continue per plan of care  Progress treatment as tolerated               Precautions Right DeQuervain's  Initial Evaluation completed on: 2022  Re-Evaluation needs to be completed before: 2022  Insurance: [employer]  FOTO: 2022  Auth Visits: N/A          Manuals 2022      Wrist Flexor Stretch  Thumb Stretch  30 sec x 3  ART x 10      Manual Massage        IASTM   Flexor Mass  Dorsal Thumb   GT #6  GT #3   GT #6  GT #3      KT Tape  SL Instability              Neuro Re-Ed  2022                                                              Ther Ex 2022      Prayer Stretch        Wrist AROM  Flex/Ext  RD/UD     HEP  W/ Green Ball  X 20  X 20      Wrist Maze  X 5      Digi-Flex        Hand Power Pro        Flex Bar   Twists  Bends        Wrist Extensor Stretch HEP 30 sec x 3      Thumb Radial Deviation Stretch HEP 30 sec x 3      Thumb Extension Stretch HEP 30 sec x 3      Dart Thrower Motion  X 20      Thumb Raises w/ Hand Sup  X 10              Ther Activity 2022      Tension Pin Pom Pom        Grooved Peg Board                                Modalities 2022      Paraffin Performed at end of session w/ MH Performed at end of session w/ MH      Ultrasound  Thumb 3 3 MHz  50% rate  0 8 intensity 3 3 MHz  50% rate  0 8 intensity      Splint Fabrication Thumb SPICA Splint

## 2022-11-21 ENCOUNTER — OFFICE VISIT (OUTPATIENT)
Dept: OCCUPATIONAL THERAPY | Facility: CLINIC | Age: 37
End: 2022-11-21

## 2022-11-21 DIAGNOSIS — M65.4 DE QUERVAIN'S TENOSYNOVITIS, RIGHT: ICD-10-CM

## 2022-11-21 DIAGNOSIS — M25.531 RIGHT WRIST PAIN: Primary | ICD-10-CM

## 2022-11-21 NOTE — PROGRESS NOTES
Daily Note     Today's date: 2022  Patient name: Lianet Mcgee  : 1985  MRN: [id number]  Referring provider: UZAIR Barber*  Dx:   Encounter Diagnosis     ICD-10-CM    1  Right wrist pain  M25 531       2  De Quervain's tenosynovitis, right  M65 4               TIME:  9:15 - 10:00 =   10:00 - 10:15 = Group         Subjective: It was really hurting after I went grocery shopping, but I had to  Objective: See treatment diary below      Assessment: Tolerated treatment well  Patient exhibited good technique with therapeutic exercises and would benefit from continued OT Pt presents this date with pain 2/10, and participated in skilled OT with focus on ROM, symptom management, minimal strength, and HEP review, for increased safety and engagement in ADL/IADL activity  Therapist encouraging pt to wear splint to help with pain with daily activity when doing heavy activity such as lifiting kids, shopping, cooking, and cleaning, with pt stating verbal understanding  Plan: Continue per plan of care  Progress treatment as tolerated  Progress treament per protocol             Precautions Right Lara's  Initial Evaluation completed on: 2022  Re-Evaluation needs to be completed before: 2022  Insurance: [employer]  FOTO: 2022  Auth Visits: N/A          Manuals 2022     Wrist Flexor Stretch  Thumb Stretch  30 sec x 3  ART x 10 Performed     Manual Massage        IASTM   Flexor Mass  Dorsal Thumb   GT #6  GT #3   GT #6  GT #3   GT# 3     KT Tape  SL Instability              Neuro Re-Ed  2022                                                              Ther Ex 2022     Prayer Stretch        Wrist AROM  Flex/Ext  RD/UD     HEP  W/ Green Ball  X 20  X 20   X 20  X 20     Wrist Maze  X 5 X 5     Digi-Flex        Hand Power Pro        Flex Bar   Twists  Bends        Wrist Extensor Stretch HEP 30 sec x 3 Thumb Radial Deviation Stretch HEP 30 sec x 3 30 sec x 3     Thumb Extension Stretch HEP 30 sec x 3 30 sec x 10     Dart Thrower Motion  X 20 X 20     Thumb Raises w/ Hand Sup  X 10 X 10             Ther Activity 11/07/2022 11/14/2022 11/21/2022     Tension Pin Pom Pom        Grooved Peg Board                                Modalities 11/07/2022 11/14/2022 11/21/2022     Paraffin Performed at end of session w/ MH Performed at end of session w/ MH Performed Post session w/MH     Ultrasound  Thumb 3 3 MHz  50% rate  0 8 intensity 3 3 MHz  50% rate  0 8 intensity 3 3 MHz, 50% rate, 0 8 intensity     Splint Fabrication Thumb SPICA Splint  At night - heavy activity

## 2022-11-28 NOTE — PROGRESS NOTES
Daily Note     Today's date: 2022  Patient name: Néstor Gong  : 1985  MRN: [id number]  Referring provider: UZAIR Rivera*  Dx:   Encounter Diagnosis     ICD-10-CM    1  Right wrist pain  M25 531       2  De Quervain's tenosynovitis, right  M65 4                      Subjective: Therapy doesn't really seem to be helping  I have more clicking and popping now and I didn't have that prior  Objective: See treatment diary below      Assessment: Tolerated treatment well  Patient exhibited good technique with therapeutic exercises and would benefit from continued OT  Pain 2-3  Pt reports she did has some relief with KT tape when trialed  Flexor mass tightness noted, decreased post manual massage  Reports minimal relief with wearing nighttime splint  Plan: Continue per plan of care  Progress treatment as tolerated  Pt to see MD dangelo tx            Precautions Right DeQuervain's  Initial Evaluation completed on: 2022  Re-Evaluation needs to be completed before: 2022  Insurance: [employer]  FOTO: 2022  Auth Visits: N/A          Manuals 2022    Wrist Flexor Stretch  Thumb Stretch  30 sec x 3  ART x 10 Performed Performed    Manual Massage        IASTM   Flexor Mass  Dorsal Thumb   GT #6  GT #3   GT #6  GT #3   GT# 3     KT Tape  SL Instability  Carpal tunnel and thumb            Neuro Re-Ed  2022                                                              Ther Ex 2022    Prayer Stretch        Wrist AROM  Flex/Ext  RD/UD     HEP  W/ Mookie Shouts Ball  X 20  X 20   X 20  X 20   X 20  X 20    Wrist Maze  X 5 X 5     Digi-Flex        Hand Power Pro        Flex Bar   Twists  Bends        Wrist Extensor Stretch HEP 30 sec x 3      Thumb Radial Deviation Stretch HEP 30 sec x 3 30 sec x 3 30 sec x 3    Thumb Extension Stretch HEP 30 sec x 3 30 sec x 10 30 sec x 5    Dart Thrower Motion  X 20 X 20     Thumb Raises w/ Hand Sup  X 10 X 10 X 20            Ther Activity 11/07/2022 11/14/2022 11/21/2022 11/29/2022    Tension Pin Pom Pom        Grooved Peg Board                                Modalities 11/07/2022 11/14/2022 11/21/2022 11/29/2022    Paraffin Performed at end of session w/ MH Performed at end of session w/ MH Performed Post session w/MH Performed end of session    Ultrasound  Thumb 3 3 MHz  50% rate  0 8 intensity 3 3 MHz  50% rate  0 8 intensity 3 3 MHz, 50% rate, 0 8 intensity 3 3 MHz, 50% rate, 0 8 intensity    Splint Fabrication Thumb SPICA Splint  At night - heavy activity

## 2022-11-29 ENCOUNTER — OFFICE VISIT (OUTPATIENT)
Dept: OCCUPATIONAL THERAPY | Facility: CLINIC | Age: 37
End: 2022-11-29

## 2022-11-29 DIAGNOSIS — F33.8 SEASONAL DEPRESSION (HCC): ICD-10-CM

## 2022-11-29 DIAGNOSIS — M65.4 DE QUERVAIN'S TENOSYNOVITIS, RIGHT: ICD-10-CM

## 2022-11-29 DIAGNOSIS — F41.9 ANXIETY: ICD-10-CM

## 2022-11-29 DIAGNOSIS — M25.531 RIGHT WRIST PAIN: Primary | ICD-10-CM

## 2022-11-29 DIAGNOSIS — Z71.6 ENCOUNTER FOR SMOKING CESSATION COUNSELING: ICD-10-CM

## 2022-11-29 RX ORDER — BUPROPION HYDROCHLORIDE 150 MG/1
150 TABLET ORAL EVERY MORNING
Qty: 90 TABLET | Refills: 1 | Status: SHIPPED | OUTPATIENT
Start: 2022-11-29

## 2022-12-07 ENCOUNTER — APPOINTMENT (OUTPATIENT)
Dept: LAB | Age: 37
End: 2022-12-07

## 2022-12-07 ENCOUNTER — OFFICE VISIT (OUTPATIENT)
Dept: OBGYN CLINIC | Facility: CLINIC | Age: 37
End: 2022-12-07

## 2022-12-07 VITALS — HEIGHT: 64 IN | BODY MASS INDEX: 29.88 KG/M2 | WEIGHT: 175 LBS

## 2022-12-07 DIAGNOSIS — M77.8 RIGHT WRIST TENDONITIS: ICD-10-CM

## 2022-12-07 DIAGNOSIS — M25.531 RIGHT WRIST PAIN: Primary | ICD-10-CM

## 2022-12-07 LAB
ANA SER QL IA: NEGATIVE
CRP SERPL QL: 3.6 MG/L
ERYTHROCYTE [SEDIMENTATION RATE] IN BLOOD: 22 MM/HOUR (ref 0–19)

## 2022-12-07 RX ORDER — METHYLPREDNISOLONE 4 MG/1
TABLET ORAL
Qty: 21 TABLET | Refills: 0 | Status: SHIPPED | OUTPATIENT
Start: 2022-12-07

## 2022-12-07 NOTE — PROGRESS NOTES
Hand Surgery History and Physical    22  10:30 AM  [id number]  Margie Herrera      HPI:  Pt is a 41 yo female who presents for evaluation of her right wrist   She notes pain volarly and dorsally  She also notes some radial to dorsal hand tingling at times as well  She received a steroid injection to her right first dorsal compartment which did not really provide any relief  She has been using a splint at night  She has been attending therapy as well, but feels that her symptoms are getting worse  Past Medical History:   Diagnosis Date   • Anxiety        Past Surgical History:   Procedure Laterality Date   • INDUCED          Family History   Problem Relation Age of Onset   • Alcohol abuse Mother    • Diabetes Mother    • Hypertension Maternal Grandmother    • Cancer Paternal Grandmother    • Heart disease Paternal Grandfather        Review of Systems - General ROS: negative  Ophthalmic ROS: negative  ENT ROS: negative  Respiratory ROS: negative  Cardiovascular ROS: negative  Neurological ROS: negative  Dermatological ROS: negative    There were no vitals filed for this visit  Physical Examination:  General:  NAD  HEENT:  EOMI, perrla, normal ear morphology  Chest:  Unlabored breathing  Heart:  Regular pulse  Abd:  No distension  Extrem: RUE:  Tender over first, second, fourth, 6th dorsal compartments on palpation, tender over FCR tendon volarly, tender at elbow lateral epicondyle, no erythema, able to make a fist complex, no induration  Skin:  Dry, pink  Neuro:  AAOx3     Encounter Diagnoses   Name Primary? • Right wrist pain Yes   • Right wrist tendonitis      Return in about 3 weeks (around 2022)  A/P:  Pt is a 41 yo female with right wrist pain  She notes symptoms for the last several weeks    She denies any specific inciting event     -She has tenderness of her circumferential dorsal and volar wrist along with some right elbow lateral epicondyle tenderness   -Recommend inflammatory work up to evaluate for systemic conditions   -Oral steroid dose pack after labs drawn   -Cont with splint use   -May discontinue therapy and change to rest   -F/uin 3 weeks for repeat evaluation

## 2022-12-08 LAB — RHEUMATOID FACT SER QL LA: NEGATIVE

## 2022-12-09 ENCOUNTER — TELEPHONE (OUTPATIENT)
Dept: OBGYN CLINIC | Facility: MEDICAL CENTER | Age: 37
End: 2022-12-09

## 2022-12-09 LAB — CCP AB SER IA-ACNC: <0.4

## 2022-12-09 NOTE — TELEPHONE ENCOUNTER
Caller: Chio     Doctor: Mendel Majestic     Reason for call: Patient was prescribed methylPREDNISolone 4 MG  At her visit on 12/7  Started medication yesterday  Pt states her stomach is very upset  She followed directions exactly    She is calling to see is calling to see if she is able to take Zofran 4mg that she was prescribed in May for a kidney infection  Please call and advise as soon as possible       Call back#: [phone]

## 2023-01-04 ENCOUNTER — OFFICE VISIT (OUTPATIENT)
Dept: OBGYN CLINIC | Facility: CLINIC | Age: 38
End: 2023-01-04

## 2023-01-04 VITALS — BODY MASS INDEX: 29.88 KG/M2 | HEIGHT: 64 IN | WEIGHT: 175 LBS

## 2023-01-04 DIAGNOSIS — M77.8 RIGHT WRIST TENDONITIS: Primary | ICD-10-CM

## 2023-01-04 DIAGNOSIS — M77.11 LATERAL EPICONDYLITIS OF RIGHT ELBOW: ICD-10-CM

## 2023-01-04 RX ORDER — INDOMETHACIN 50 MG/1
50 CAPSULE ORAL 2 TIMES DAILY WITH MEALS
Qty: 45 CAPSULE | Refills: 1 | Status: SHIPPED | OUTPATIENT
Start: 2023-01-04 | End: 2023-01-09 | Stop reason: SDUPTHER

## 2023-01-04 NOTE — PROGRESS NOTES
HPI: Pt is a 46 yo female with right wrist and elbow pain  She was last seen on 12/7/22 when she was noted to have tenderness over multiple extensor and flexor tendons as well as her elbow lateral epicondyle  She was prescribe an oral steroid pack which helped to reduce her symptoms while she was taking it  She has continued with wrist splinting most of the time  She notes reduced use of her right hand due to her symptoms  He notes some volar forearm tightness as well  PE:  RUE:  Tender over first dorsal compartment, tender over second dorsal compartment, some FCR tenderness, tender over ECU tendon sheath proximal to the head of the ulna, able to make a fist complex, SILT, no erythema, no edema; tender over elbow lateral epicondyle    A/P:  Pt is a 46 yo female with right wrist and elbow pain  -Inflammatory markers with nonspecific mild elevation in ESR and CRP  More specific markers were normal   -Cont with splint use  -Will start trial of indomethacin for her inflammation since oral steroids helped to some degree  -Discussed that imaging studies will not likely produce any actionable data  She agreed to hold off for now  -F/U in 4 weeks

## 2023-01-09 ENCOUNTER — TELEPHONE (OUTPATIENT)
Dept: OBGYN CLINIC | Facility: CLINIC | Age: 38
End: 2023-01-09

## 2023-01-09 DIAGNOSIS — M77.8 RIGHT WRIST TENDONITIS: Primary | ICD-10-CM

## 2023-01-09 RX ORDER — MELOXICAM 15 MG/1
15 TABLET ORAL DAILY
Qty: 30 TABLET | Refills: 0 | Status: SHIPPED | OUTPATIENT
Start: 2023-01-09 | End: 2023-01-24 | Stop reason: SDUPTHER

## 2023-01-09 NOTE — TELEPHONE ENCOUNTER
Caller: Self    Doctor: Fredy Steinberg    Reason for call: Patient started taking Indocin 50 mg on Thursday morning  She started with stomach cramps on Saturday  Last night after taking her 2nd dose the stomach pain got worse  She is now having a severe headache and neck pain  Please advise       Call transferred to nurse to assist    Call back#: [phone]

## 2023-01-09 NOTE — TELEPHONE ENCOUNTER
Spoke with patient and she states she started with stomach cramping and diarrhea after starting the indocin  For her  R Wrist tendonitis  She does feel better with GI symptoms as she did not take her morning dose  She is wondering what else there is for her to take  Advised that most likely all NSAIDS will cause same issue  Tylenol 1000mg TID, ice/heat 20 on 20 off  Would a topical Voltaren gel OTC be something she could try ? She also started with severe headache and neck pain  Advised for severe headache pain and neck pain she should seek help at ER for worsening symptoms  She said that she wanted to let Dr Altagracia Koch about this because the medication came with the precautions on headache and neck pain?   Please advise

## 2023-01-24 ENCOUNTER — OFFICE VISIT (OUTPATIENT)
Dept: OBGYN CLINIC | Facility: CLINIC | Age: 38
End: 2023-01-24

## 2023-01-24 VITALS
WEIGHT: 175 LBS | BODY MASS INDEX: 29.88 KG/M2 | SYSTOLIC BLOOD PRESSURE: 127 MMHG | HEIGHT: 64 IN | DIASTOLIC BLOOD PRESSURE: 82 MMHG | HEART RATE: 71 BPM

## 2023-01-24 DIAGNOSIS — M77.8 RIGHT WRIST TENDONITIS: Primary | ICD-10-CM

## 2023-01-24 RX ORDER — LIDOCAINE HYDROCHLORIDE 10 MG/ML
3 INJECTION, SOLUTION INFILTRATION; PERINEURAL
Status: COMPLETED | OUTPATIENT
Start: 2023-01-24 | End: 2023-01-24

## 2023-01-24 RX ORDER — MELOXICAM 15 MG/1
15 TABLET ORAL DAILY
Qty: 30 TABLET | Refills: 2 | Status: SHIPPED | OUTPATIENT
Start: 2023-01-24

## 2023-01-24 RX ORDER — BETAMETHASONE SODIUM PHOSPHATE AND BETAMETHASONE ACETATE 3; 3 MG/ML; MG/ML
6 INJECTION, SUSPENSION INTRA-ARTICULAR; INTRALESIONAL; INTRAMUSCULAR; SOFT TISSUE
Status: COMPLETED | OUTPATIENT
Start: 2023-01-24 | End: 2023-01-24

## 2023-01-24 RX ADMIN — LIDOCAINE HYDROCHLORIDE 3 ML: 10 INJECTION, SOLUTION INFILTRATION; PERINEURAL at 17:12

## 2023-01-24 RX ADMIN — BETAMETHASONE SODIUM PHOSPHATE AND BETAMETHASONE ACETATE 6 MG: 3; 3 INJECTION, SUSPENSION INTRA-ARTICULAR; INTRALESIONAL; INTRAMUSCULAR; SOFT TISSUE at 17:12

## 2023-01-24 NOTE — PROGRESS NOTES
HPI:  Pt is a 46 yo female with right wrist tendonitis  She was started on indomethacin at her last visit, but this caused GI distress  She was changed to meloxicam   She notes that the meloxicam seems to be helping as she has less discomfort overall  She notes mostly radial sided wrist pain as her main symptom which gets worse at the end of the day  PE:  RUE:  Tender over first dorsal compartment most significantly, some tenderness over volar and dorsal wrist, no edema, able to make a full fist complex, SILT    A/P:  Pt is a 46 yo female with right wrist tendonitis     -Discussed treatment options  Her symptoms are a little more focal today  -Recommend repeat right first dorsal compartment injection   -Cont meloxicam   Refills sent   -She would prefer to follow up after tax season  -Monitor symptom response  Small joint arthrocentesis  Universal Protocol:  Consent: Verbal consent obtained  Risks and benefits: risks, benefits and alternatives were discussed  Consent given by: patient  Time out: Immediately prior to procedure a "time out" was called to verify the correct patient, procedure, equipment, support staff and site/side marked as required  Timeout called at: 1/24/2023 5:11 PM   Patient understanding: patient states understanding of the procedure being performed  Site marked: the operative site was marked  Patient identity confirmed: verbally with patient    Supporting Documentation  Indications: pain   Procedure Details  Location: first dorsal compartment    Needle size: 22 G  Ultrasound guidance: no  Approach: radial  Medications administered: 6 mg betamethasone acetate-betamethasone sodium phosphate 6 (3-3) mg/mL; 3 mL lidocaine 1 %    Patient tolerance: patient tolerated the procedure well with no immediate complications  Dressing:  Sterile dressing applied

## 2023-02-17 DIAGNOSIS — Z71.6 ENCOUNTER FOR SMOKING CESSATION COUNSELING: ICD-10-CM

## 2023-02-17 DIAGNOSIS — M77.8 RIGHT WRIST TENDONITIS: ICD-10-CM

## 2023-02-17 DIAGNOSIS — F41.9 ANXIETY: ICD-10-CM

## 2023-02-17 DIAGNOSIS — F33.8 SEASONAL DEPRESSION (HCC): ICD-10-CM

## 2023-02-17 RX ORDER — BUPROPION HYDROCHLORIDE 150 MG/1
150 TABLET ORAL EVERY MORNING
Qty: 90 TABLET | Refills: 0 | Status: SHIPPED | OUTPATIENT
Start: 2023-02-17

## 2023-03-02 RX ORDER — MELOXICAM 15 MG/1
15 TABLET ORAL DAILY
Qty: 30 TABLET | Refills: 0 | Status: SHIPPED | OUTPATIENT
Start: 2023-03-02

## 2023-05-15 DIAGNOSIS — Z71.6 ENCOUNTER FOR SMOKING CESSATION COUNSELING: ICD-10-CM

## 2023-05-15 DIAGNOSIS — F41.9 ANXIETY: ICD-10-CM

## 2023-05-15 DIAGNOSIS — F33.8 SEASONAL DEPRESSION (HCC): ICD-10-CM

## 2023-05-16 NOTE — TELEPHONE ENCOUNTER
Requested medication(s) are due for refill today: Yes  Patient has already received a courtesy refill: No  Other reason request has been forwarded to provider: Refill protocol failed      Last appointment 10-27-23

## 2023-05-17 DIAGNOSIS — Z11.4 SCREENING FOR HIV (HUMAN IMMUNODEFICIENCY VIRUS): ICD-10-CM

## 2023-05-17 DIAGNOSIS — F41.9 ANXIETY: ICD-10-CM

## 2023-05-17 DIAGNOSIS — Z13.0 SCREENING FOR DEFICIENCY ANEMIA: ICD-10-CM

## 2023-05-17 DIAGNOSIS — F33.8 SEASONAL DEPRESSION (HCC): Primary | ICD-10-CM

## 2023-05-17 DIAGNOSIS — Z13.29 THYROID DISORDER SCREEN: ICD-10-CM

## 2023-05-17 DIAGNOSIS — Z13.220 LIPID SCREENING: ICD-10-CM

## 2023-05-17 DIAGNOSIS — Z13.1 DIABETES MELLITUS SCREENING: ICD-10-CM

## 2023-05-17 RX ORDER — BUPROPION HYDROCHLORIDE 150 MG/1
150 TABLET ORAL EVERY MORNING
Qty: 30 TABLET | Refills: 0 | Status: SHIPPED | OUTPATIENT
Start: 2023-05-17

## 2023-05-17 NOTE — TELEPHONE ENCOUNTER
Traci's pt- now attributed to me - is long overdue for routine visit- last visit 10/2022 was for acute problem only  I placed orders for fasting labs to obtain prior to appt - please call pt to schedule a follow up visit

## 2023-06-07 ENCOUNTER — APPOINTMENT (OUTPATIENT)
Dept: LAB | Facility: CLINIC | Age: 38
End: 2023-06-07
Payer: COMMERCIAL

## 2023-06-07 DIAGNOSIS — F33.8 SEASONAL DEPRESSION (HCC): ICD-10-CM

## 2023-06-07 DIAGNOSIS — Z11.4 SCREENING FOR HIV (HUMAN IMMUNODEFICIENCY VIRUS): ICD-10-CM

## 2023-06-07 DIAGNOSIS — Z13.0 SCREENING FOR DEFICIENCY ANEMIA: ICD-10-CM

## 2023-06-07 DIAGNOSIS — Z13.1 DIABETES MELLITUS SCREENING: ICD-10-CM

## 2023-06-07 DIAGNOSIS — F41.9 ANXIETY: ICD-10-CM

## 2023-06-07 DIAGNOSIS — Z13.220 LIPID SCREENING: ICD-10-CM

## 2023-06-07 DIAGNOSIS — Z13.29 THYROID DISORDER SCREEN: ICD-10-CM

## 2023-06-07 LAB
ALBUMIN SERPL BCP-MCNC: 3.8 G/DL (ref 3.5–5)
ALP SERPL-CCNC: 59 U/L (ref 46–116)
ALT SERPL W P-5'-P-CCNC: 31 U/L (ref 12–78)
ANION GAP SERPL CALCULATED.3IONS-SCNC: 3 MMOL/L (ref 4–13)
AST SERPL W P-5'-P-CCNC: 17 U/L (ref 5–45)
BASOPHILS # BLD AUTO: 0.03 THOUSANDS/ÂΜL (ref 0–0.1)
BASOPHILS NFR BLD AUTO: 1 % (ref 0–1)
BILIRUB SERPL-MCNC: 0.44 MG/DL (ref 0.2–1)
BUN SERPL-MCNC: 11 MG/DL (ref 5–25)
CALCIUM SERPL-MCNC: 9.2 MG/DL (ref 8.3–10.1)
CHLORIDE SERPL-SCNC: 109 MMOL/L (ref 96–108)
CHOLEST SERPL-MCNC: 217 MG/DL
CO2 SERPL-SCNC: 24 MMOL/L (ref 21–32)
CREAT SERPL-MCNC: 0.96 MG/DL (ref 0.6–1.3)
EOSINOPHIL # BLD AUTO: 0.06 THOUSAND/ÂΜL (ref 0–0.61)
EOSINOPHIL NFR BLD AUTO: 2 % (ref 0–6)
ERYTHROCYTE [DISTWIDTH] IN BLOOD BY AUTOMATED COUNT: 13.7 % (ref 11.6–15.1)
GFR SERPL CREATININE-BSD FRML MDRD: 75 ML/MIN/1.73SQ M
GLUCOSE P FAST SERPL-MCNC: 82 MG/DL (ref 65–99)
HCT VFR BLD AUTO: 40.3 % (ref 34.8–46.1)
HDLC SERPL-MCNC: 90 MG/DL
HGB BLD-MCNC: 13.6 G/DL (ref 11.5–15.4)
HIV 1+2 AB+HIV1 P24 AG SERPL QL IA: NORMAL
HIV 2 AB SERPL QL IA: NORMAL
HIV1 AB SERPL QL IA: NORMAL
HIV1 P24 AG SERPL QL IA: NORMAL
IMM GRANULOCYTES # BLD AUTO: 0.01 THOUSAND/UL (ref 0–0.2)
IMM GRANULOCYTES NFR BLD AUTO: 0 % (ref 0–2)
LDLC SERPL CALC-MCNC: 118 MG/DL (ref 0–100)
LYMPHOCYTES # BLD AUTO: 1.48 THOUSANDS/ÂΜL (ref 0.6–4.47)
LYMPHOCYTES NFR BLD AUTO: 41 % (ref 14–44)
MCH RBC QN AUTO: 29.3 PG (ref 26.8–34.3)
MCHC RBC AUTO-ENTMCNC: 33.7 G/DL (ref 31.4–37.4)
MCV RBC AUTO: 87 FL (ref 82–98)
MONOCYTES # BLD AUTO: 0.41 THOUSAND/ÂΜL (ref 0.17–1.22)
MONOCYTES NFR BLD AUTO: 11 % (ref 4–12)
NEUTROPHILS # BLD AUTO: 1.64 THOUSANDS/ÂΜL (ref 1.85–7.62)
NEUTS SEG NFR BLD AUTO: 45 % (ref 43–75)
NONHDLC SERPL-MCNC: 127 MG/DL
NRBC BLD AUTO-RTO: 0 /100 WBCS
PLATELET # BLD AUTO: 261 THOUSANDS/UL (ref 149–390)
PMV BLD AUTO: 9.5 FL (ref 8.9–12.7)
POTASSIUM SERPL-SCNC: 4.1 MMOL/L (ref 3.5–5.3)
PROT SERPL-MCNC: 7.9 G/DL (ref 6.4–8.4)
RBC # BLD AUTO: 4.64 MILLION/UL (ref 3.81–5.12)
SODIUM SERPL-SCNC: 136 MMOL/L (ref 135–147)
TRIGL SERPL-MCNC: 46 MG/DL
TSH SERPL DL<=0.05 MIU/L-ACNC: 2.27 UIU/ML (ref 0.45–4.5)
WBC # BLD AUTO: 3.63 THOUSAND/UL (ref 4.31–10.16)

## 2023-06-07 PROCEDURE — 87389 HIV-1 AG W/HIV-1&-2 AB AG IA: CPT

## 2023-06-07 PROCEDURE — 85025 COMPLETE CBC W/AUTO DIFF WBC: CPT

## 2023-06-07 PROCEDURE — 80053 COMPREHEN METABOLIC PANEL: CPT

## 2023-06-07 PROCEDURE — 36415 COLL VENOUS BLD VENIPUNCTURE: CPT

## 2023-06-07 PROCEDURE — 80061 LIPID PANEL: CPT

## 2023-06-07 PROCEDURE — 84443 ASSAY THYROID STIM HORMONE: CPT

## 2023-06-09 ENCOUNTER — RA CDI HCC (OUTPATIENT)
Dept: OTHER | Facility: HOSPITAL | Age: 38
End: 2023-06-09

## 2023-06-09 NOTE — PROGRESS NOTES
[facility]  coding opportunities       Chart reviewed, no opportunity found: CHART REVIEWED, NO OPPORTUNITY FOUND     Patients Insurance     Commercial Insurance: [address] [place] [address]

## 2023-06-12 ENCOUNTER — OFFICE VISIT (OUTPATIENT)
Dept: FAMILY MEDICINE CLINIC | Facility: CLINIC | Age: 38
End: 2023-06-12
Payer: COMMERCIAL

## 2023-06-12 VITALS
BODY MASS INDEX: 30.46 KG/M2 | HEIGHT: 64 IN | DIASTOLIC BLOOD PRESSURE: 70 MMHG | HEART RATE: 64 BPM | SYSTOLIC BLOOD PRESSURE: 110 MMHG | OXYGEN SATURATION: 99 % | WEIGHT: 178.4 LBS | TEMPERATURE: 98.9 F

## 2023-06-12 DIAGNOSIS — Z00.00 ANNUAL PHYSICAL EXAM: Primary | ICD-10-CM

## 2023-06-12 DIAGNOSIS — G56.01 RIGHT CARPAL TUNNEL SYNDROME: ICD-10-CM

## 2023-06-12 DIAGNOSIS — D70.9 NEUTROPENIA, UNSPECIFIED TYPE (HCC): ICD-10-CM

## 2023-06-12 DIAGNOSIS — F41.9 ANXIETY: ICD-10-CM

## 2023-06-12 PROCEDURE — 99214 OFFICE O/P EST MOD 30 MIN: CPT | Performed by: FAMILY MEDICINE

## 2023-06-12 PROCEDURE — 99395 PREV VISIT EST AGE 18-39: CPT | Performed by: FAMILY MEDICINE

## 2023-06-12 RX ORDER — GABAPENTIN 300 MG/1
300 CAPSULE ORAL DAILY PRN
Qty: 30 CAPSULE | Refills: 0 | Status: SHIPPED | OUTPATIENT
Start: 2023-06-12 | End: 2023-07-12

## 2023-06-12 RX ORDER — BUPROPION HYDROCHLORIDE 300 MG/1
300 TABLET ORAL DAILY
Qty: 90 TABLET | Refills: 1 | Status: SHIPPED | OUTPATIENT
Start: 2023-06-12

## 2023-06-12 NOTE — PROGRESS NOTES
ADULT ANNUAL [id number]    NAME: Weston Montero  AGE: 45 y o  SEX: female  : 1985   DATE: 2023     Assessment and Plan:     Problem List Items Addressed This Visit    None  Visit Diagnoses     Anxiety    -  Primary    Relevant Medications    buPROPion (Wellbutrin XL) 300 mg 24 hr tablet    Right carpal tunnel syndrome        Relevant Medications    gabapentin (NEURONTIN) 300 mg capsule    Neutropenia, unspecified type ([facility] )        Relevant Orders    CBC and differential          Immunizations and preventive care screenings were discussed with patient today  Appropriate education was printed on patient's after visit summary  Counseling:  · {Annual Physical; Counselin}         No follow-ups on file  Chief Complaint:     Chief Complaint   Patient presents with   • Physical Exam      History of Present Illness:     Adult Annual Physical   Patient here for a comprehensive physical exam  The patient reports {problems:33877}  Diet and Physical Activity  · Diet/Nutrition: {annual physical; diet:}  · Exercise: {annual physical; exercise:2102}  Depression Screening  PHQ-2/9 Depression Screening    Little interest or pleasure in doing things: 0 - not at all  Feeling down, depressed, or hopeless: 0 - not at all  PHQ-2 Score: 0  PHQ-2 Interpretation: Negative depression screen       General Health  · Sleep: {annual physical; sleep:2102}  · Hearing: {annual physical; hearin}  · Vision: {annual physical; vision:}  · Dental: {annual physical; dental:}  /GYN Health  · Last menstrual period: ***  · Contraceptive method: {contraceptive options:}  · History of STDs?: {YES/NO:}       Review of Systems:     Review of Systems   Past Medical History:     Past Medical History:   Diagnosis Date   • Anxiety       Past Surgical History:     Past Surgical History: Procedure Laterality Date   • INDUCED         Social History:     Social History     Socioeconomic History   • Marital status: Single     Spouse name: None   • Number of children: None   • Years of education: None   • Highest education level: None   Occupational History   • None   Tobacco Use   • Smoking status: Former     Packs/day: 0 10     Types: Cigarettes   • Smokeless tobacco: Never   • Tobacco comments:     smokes 1 cig per day plans to stop by summer   Vaping Use   • Vaping Use: Never used   Substance and Sexual Activity   • Alcohol use:  Yes     Alcohol/week: 2 0 standard drinks of alcohol     Types: 2 Cans of beer per week     Comment: occasionally   • Drug use: Yes     Types: Marijuana   • Sexual activity: Yes     Partners: Female, Male     Birth control/protection: Condom Male, OCP   Other Topics Concern   • None   Social History Narrative   • None     Social Determinants of Health     Financial Resource Strain: Not on file   Food Insecurity: Not on file   Transportation Needs: Not on file   Physical Activity: Not on file   Stress: Not on file   Social Connections: Not on file   Intimate Partner Violence: Not on file   Housing Stability: Not on file      Family History:     Family History   Problem Relation Age of Onset   • Alcohol abuse Mother    • Diabetes Mother    • Hypertension Maternal Grandmother    • Cancer Paternal Grandmother    • Heart disease Paternal Grandfather       Current Medications:     Current Outpatient Medications   Medication Sig Dispense Refill   • buPROPion (Wellbutrin XL) 300 mg 24 hr tablet Take 1 tablet (300 mg total) by mouth daily 90 tablet 1   • gabapentin (NEURONTIN) 300 mg capsule Take 1 capsule (300 mg total) by mouth daily as needed (arm pain) 30 capsule 0   • ibuprofen (MOTRIN) 600 mg tablet Take 1 tablet (600 mg total) by mouth every 6 (six) hours as needed for mild pain 30 tablet 0   • ondansetron (Zofran ODT) 4 mg disintegrating tablet Take 1 tablet (4 mg "total) by mouth every 6 (six) hours as needed for nausea or vomiting 12 tablet 0     No current facility-administered medications for this visit        Allergies:     No Known Allergies   Physical Exam:     /70 (BP Location: Left arm, Patient Position: Sitting, Cuff Size: Large)   Pulse 64   Temp 98 9 °F (37 2 °C) (Tympanic)   Ht 5' 4\" (1 626 m)   Wt 80 9 kg (178 lb 6 4 oz)   SpO2 99%   BMI 30 62 kg/m²     Physical Exam     Ruiz Madrigal MD   [address]  "

## 2023-06-12 NOTE — PROGRESS NOTES
Name: Blake Nair      : 1985      MRN: [id number]  Encounter Provider: Benjie Mcclure MD  Encounter Date: 2023   Encounter department: [facility] AT [address] [place] [address]  Annual physical exam    2  Anxiety  -     buPROPion (Wellbutrin XL) 300 mg 24 hr tablet; Take 1 tablet (300 mg total) by mouth daily    3  Right carpal tunnel syndrome  -     gabapentin (NEURONTIN) 300 mg capsule; Take 1 capsule (300 mg total) by mouth daily as needed (arm pain)    4  Neutropenia, unspecified type ([address] )  -     CBC and differential; Future    Anxiety uncontrolled  Increase Wellbutrin from 150 mg to 300 mg daily  Experiencing right wrist radicular pain  Unresponsive to NSAIDs  Trial gabapentin  Side effects discussed  Follow-up  In 1 year or as needed  Repeat CBC for neutropenia in 3 months  BMI Counseling: Body mass index is 30 62 kg/m²  The BMI is above normal  Nutrition recommendations include decreasing portion sizes, encouraging healthy choices of fruits and vegetables, decreasing fast food intake, consuming healthier snacks, limiting drinks that contain sugar, moderation in carbohydrate intake, increasing intake of lean protein, reducing intake of saturated and trans fat and reducing intake of cholesterol  Exercise recommendations include exercising 3-5 times per week  Rationale for BMI follow-up plan is due to patient being overweight or obese  Depression Screening and Follow-up Plan: Patient was screened for depression during today's encounter  They screened negative with a PHQ-2 score of 0  Subjective      Presents to the office for annual exam and chronic follow-up  Has history anxiety   She also smokes but has cut down to starting Wellbutrin  Has been taking an extra dose of Wellbutrin which was discussed with her previous PCP in the past   She thinks she would like to increase indefinitely and start Wellbutrin 300 mg daily    She is telling "medication well and feels like it helps with her anxiety  She also recently had lab work done  She was seeing orthopedics for tendinitis and carpal tunnel and right hand  Symptoms are not any better after a few treatments and NSAIDs  Describes the pain in her pointer finger on the right side that kind of shoots up her arm  She is open to trying other treatment options  Would like to see a hand specialist at some point but she is not exactly sure when  Review of Systems   Musculoskeletal: Positive for arthralgias  Psychiatric/Behavioral: The patient is nervous/anxious  All other systems reviewed and are negative  Current Outpatient Medications on File Prior to Visit   Medication Sig   • ibuprofen (MOTRIN) 600 mg tablet Take 1 tablet (600 mg total) by mouth every 6 (six) hours as needed for mild pain   • ondansetron (Zofran ODT) 4 mg disintegrating tablet Take 1 tablet (4 mg total) by mouth every 6 (six) hours as needed for nausea or vomiting   • [DISCONTINUED] buPROPion (Wellbutrin XL) 150 mg 24 hr tablet Take 1 tablet (150 mg total) by mouth every morning   • [DISCONTINUED] meloxicam (Mobic) 15 mg tablet Take 1 tablet (15 mg total) by mouth daily (Patient not taking: Reported on 6/12/2023)   • [DISCONTINUED] methylPREDNISolone 4 MG tablet therapy pack Use as directed on package (Patient not taking: Reported on 1/24/2023)       Objective     /70 (BP Location: Left arm, Patient Position: Sitting, Cuff Size: Large)   Pulse 64   Temp 98 9 °F (37 2 °C) (Tympanic)   Ht 5' 4\" (1 626 m)   Wt 80 9 kg (178 lb 6 4 oz)   SpO2 99%   BMI 30 62 kg/m²     Physical Exam  Vitals and nursing note reviewed  Constitutional:       General: She is not in acute distress  Appearance: Normal appearance  She is not ill-appearing, toxic-appearing or diaphoretic  HENT:      Head: Normocephalic and atraumatic  Right Ear: External ear normal       Nose: No congestion or rhinorrhea        Mouth/Throat:    " Mouth: Mucous membranes are moist       Pharynx: Oropharynx is clear  No oropharyngeal exudate or posterior oropharyngeal erythema  Eyes:      General:         Right eye: No discharge  Left eye: No discharge  Extraocular Movements: Extraocular movements intact  Conjunctiva/sclera: Conjunctivae normal       Pupils: Pupils are equal, round, and reactive to light  Cardiovascular:      Rate and Rhythm: Normal rate and regular rhythm  Pulses: Normal pulses  Heart sounds: Normal heart sounds  No murmur heard  Pulmonary:      Effort: Pulmonary effort is normal  No respiratory distress  Breath sounds: Normal breath sounds  Abdominal:      General: Abdomen is flat  Bowel sounds are normal  There is no distension  Palpations: There is no mass  Tenderness: There is no abdominal tenderness  Musculoskeletal:         General: Normal range of motion  Cervical back: Normal range of motion and neck supple  Skin:     General: Skin is warm and dry  Capillary Refill: Capillary refill takes less than 2 seconds  Neurological:      General: No focal deficit present  Mental Status: She is alert and oriented to person, place, and time  Cranial Nerves: No cranial nerve deficit  Psychiatric:         Mood and Affect: Mood normal          Behavior: Behavior normal          Thought Content:  Thought content normal          Judgment: Judgment normal        Sobeida Billings MD

## 2023-12-19 DIAGNOSIS — F41.9 ANXIETY: ICD-10-CM

## 2023-12-19 RX ORDER — BUPROPION HYDROCHLORIDE 300 MG/1
300 TABLET ORAL DAILY
Qty: 90 TABLET | Refills: 0 | Status: SHIPPED | OUTPATIENT
Start: 2023-12-19

## 2024-04-23 DIAGNOSIS — F41.9 ANXIETY: ICD-10-CM

## 2024-04-24 RX ORDER — BUPROPION HYDROCHLORIDE 300 MG/1
300 TABLET ORAL DAILY
Qty: 90 TABLET | Refills: 1 | Status: SHIPPED | OUTPATIENT
Start: 2024-04-24

## 2024-08-19 DIAGNOSIS — F41.9 ANXIETY: ICD-10-CM

## 2024-08-19 RX ORDER — BUPROPION HYDROCHLORIDE 300 MG/1
300 TABLET ORAL DAILY
Qty: 30 TABLET | Refills: 0 | Status: SHIPPED | OUTPATIENT
Start: 2024-08-19

## 2024-08-20 NOTE — TELEPHONE ENCOUNTER
Covering for pt's PMD - pt last seen over a year ago 06/2023 - I am authorizing courtesy refill - please call pt to schedule appt with her PMD ASAP